# Patient Record
Sex: MALE | Race: OTHER | Employment: FULL TIME | URBAN - METROPOLITAN AREA
[De-identification: names, ages, dates, MRNs, and addresses within clinical notes are randomized per-mention and may not be internally consistent; named-entity substitution may affect disease eponyms.]

---

## 2017-03-13 ENCOUNTER — OFFICE VISIT (OUTPATIENT)
Dept: FAMILY MEDICINE CLINIC | Facility: CLINIC | Age: 52
End: 2017-03-13

## 2017-03-13 VITALS
SYSTOLIC BLOOD PRESSURE: 105 MMHG | RESPIRATION RATE: 18 BRPM | HEIGHT: 70 IN | BODY MASS INDEX: 30.35 KG/M2 | TEMPERATURE: 98 F | WEIGHT: 212 LBS | HEART RATE: 76 BPM | DIASTOLIC BLOOD PRESSURE: 76 MMHG

## 2017-03-13 DIAGNOSIS — M10.9 ACUTE GOUT OF RIGHT FOOT, UNSPECIFIED CAUSE: ICD-10-CM

## 2017-03-13 PROCEDURE — 99213 OFFICE O/P EST LOW 20 MIN: CPT | Performed by: FAMILY MEDICINE

## 2017-03-13 PROCEDURE — 99212 OFFICE O/P EST SF 10 MIN: CPT | Performed by: FAMILY MEDICINE

## 2017-03-13 RX ORDER — ALLOPURINOL 100 MG/1
100 TABLET ORAL DAILY
Qty: 90 TABLET | Refills: 1 | Status: SHIPPED | OUTPATIENT
Start: 2017-03-13 | End: 2017-06-13

## 2017-03-13 RX ORDER — INDOMETHACIN 50 MG/1
CAPSULE ORAL
Refills: 2 | COMMUNITY
Start: 2017-03-10 | End: 2017-05-26

## 2017-03-13 NOTE — PROGRESS NOTES
HPI:    Patient ID: Karly Sen is a 54year old male. HPI Comments: Pt presents with a gout flare up about one week ago. Pt did have some ETOH and red meat a week ago. Pt has been taking indomethacin and better today.  Pt does not take any preventive

## 2017-05-09 ENCOUNTER — OFFICE VISIT (OUTPATIENT)
Dept: FAMILY MEDICINE CLINIC | Facility: CLINIC | Age: 52
End: 2017-05-09

## 2017-05-09 ENCOUNTER — HOSPITAL ENCOUNTER (OUTPATIENT)
Dept: GENERAL RADIOLOGY | Age: 52
Discharge: HOME | End: 2017-05-09
Attending: FAMILY MEDICINE | Admitting: FAMILY MEDICINE
Payer: COMMERCIAL

## 2017-05-09 VITALS
BODY MASS INDEX: 30.49 KG/M2 | HEART RATE: 76 BPM | TEMPERATURE: 98 F | WEIGHT: 213 LBS | SYSTOLIC BLOOD PRESSURE: 129 MMHG | HEIGHT: 70 IN | RESPIRATION RATE: 16 BRPM | DIASTOLIC BLOOD PRESSURE: 83 MMHG

## 2017-05-09 DIAGNOSIS — S99.911A RIGHT ANKLE INJURY, INITIAL ENCOUNTER: ICD-10-CM

## 2017-05-09 PROCEDURE — 99213 OFFICE O/P EST LOW 20 MIN: CPT | Performed by: FAMILY MEDICINE

## 2017-05-09 PROCEDURE — 99212 OFFICE O/P EST SF 10 MIN: CPT | Performed by: FAMILY MEDICINE

## 2017-05-09 PROCEDURE — 73610 X-RAY EXAM OF ANKLE: CPT | Performed by: FAMILY MEDICINE

## 2017-05-09 RX ORDER — HYDROCODONE BITARTRATE AND ACETAMINOPHEN 5; 325 MG/1; MG/1
1 TABLET ORAL EVERY 6 HOURS PRN
Qty: 30 TABLET | Refills: 0 | Status: SHIPPED | OUTPATIENT
Start: 2017-05-09 | End: 2017-05-16 | Stop reason: ALTCHOICE

## 2017-05-09 NOTE — PROGRESS NOTES
HPI:    Patient ID: Avila Paulino is a 54year old male. HPI Comments: Pt presents with pain of the right ankle for about one month after he injured his ankle while jogging. Pt twisted it when he stepped on an uneven surface.  Pt has been limping and not indomethacin 50 MG Oral Cap TK ONE C PO BID WF Disp:  Rfl: 2   allopurinol 100 MG Oral Tab Take 1 tablet (100 mg total) by mouth daily. Disp: 90 tablet Rfl: 1   Vitamin D3 2000 UNITS Oral Cap Take 2,000 Units by mouth daily.  Disp:  Rfl:      Allergies:No Imaging & Referrals:  XR ANKLE (MIN 3 VIEWS), RIGHT (CPT=73610)       ZY#[id number]

## 2017-05-10 ENCOUNTER — TELEPHONE (OUTPATIENT)
Dept: FAMILY MEDICINE CLINIC | Facility: CLINIC | Age: 52
End: 2017-05-10

## 2017-05-10 DIAGNOSIS — R93.6 ABNORMAL X-RAY OF LOWER EXTREMITY: Primary | ICD-10-CM

## 2017-05-10 DIAGNOSIS — S99.911A ANKLE INJURY, RIGHT, INITIAL ENCOUNTER: ICD-10-CM

## 2017-05-11 ENCOUNTER — HOSPITAL ENCOUNTER (OUTPATIENT)
Dept: MRI IMAGING | Age: 52
Discharge: HOME | End: 2017-05-11
Attending: FAMILY MEDICINE
Payer: COMMERCIAL

## 2017-05-11 ENCOUNTER — TELEPHONE (OUTPATIENT)
Dept: FAMILY MEDICINE CLINIC | Facility: CLINIC | Age: 52
End: 2017-05-11

## 2017-05-11 DIAGNOSIS — S99.911A ANKLE INJURY, RIGHT, INITIAL ENCOUNTER: ICD-10-CM

## 2017-05-11 DIAGNOSIS — R93.6 ABNORMAL X-RAY OF LOWER EXTREMITY: ICD-10-CM

## 2017-05-11 PROCEDURE — 73721 MRI JNT OF LWR EXTRE W/O DYE: CPT | Performed by: FAMILY MEDICINE

## 2017-05-11 NOTE — TELEPHONE ENCOUNTER
Pt returned Dr. Chitra Huber call. Informed him of test results and recommendations. Name and  verified. Dr. Jayda Alba, Pt asking if he needs to wear a boot in the meantime?  Please advise    Result Notes      Notes Recorded by Giacomo Arauz MD on 20

## 2017-05-11 NOTE — TELEPHONE ENCOUNTER
It would be good if he used the ankle wrap for now. If he has a special boot that would be fine.  This will probably be discussed when he sees orthopedics

## 2017-05-12 ENCOUNTER — PATIENT MESSAGE (OUTPATIENT)
Dept: FAMILY MEDICINE CLINIC | Facility: CLINIC | Age: 52
End: 2017-05-12

## 2017-05-15 ENCOUNTER — TELEPHONE (OUTPATIENT)
Dept: FAMILY MEDICINE CLINIC | Facility: CLINIC | Age: 52
End: 2017-05-15

## 2017-05-15 RX ORDER — HYDROCODONE BITARTRATE AND ACETAMINOPHEN 10; 325 MG/1; MG/1
1 TABLET ORAL EVERY 6 HOURS PRN
Qty: 30 TABLET | Refills: 0 | Status: SHIPPED | OUTPATIENT
Start: 2017-05-15 | End: 2017-06-12

## 2017-05-15 NOTE — TELEPHONE ENCOUNTER
Pt called message left that his Rx is ready for  at the [facility] .   No further action needed

## 2017-05-15 NOTE — TELEPHONE ENCOUNTER
From: Zak Perrin  To: Ivonne Villasenor MD  Sent: 5/12/2017 7:35 AM CDT  Subject: Prescription Question     Good morning Dr. Ghulam Miranda sending you this note to see if you can prescribe a different type of medicine for the pain in my ankle.  The last one that you

## 2017-05-15 NOTE — TELEPHONE ENCOUNTER
From   Ketty Suh    To   Bhupinder Mcdaniel MD    Sent   5/12/2017  7:35 AM          Good morning Dr. Gwendolyn Humphries sending you this note to see if you can prescribe a different type of medicine for the pain in my ankle.  The last one that you prescribed is really no

## 2017-05-15 NOTE — TELEPHONE ENCOUNTER
Message noted: Chart reviewed and may increase norco to 10/325mg as requested times one. Script printed and left at  here at [facility]. Please notify pt.

## 2017-05-16 ENCOUNTER — OFFICE VISIT (OUTPATIENT)
Dept: ORTHOPEDICS CLINIC | Facility: CLINIC | Age: 52
End: 2017-05-16

## 2017-05-16 DIAGNOSIS — S93.421A SPRAIN OF DELTOID LIGAMENT OF RIGHT ANKLE, INITIAL ENCOUNTER: ICD-10-CM

## 2017-05-16 DIAGNOSIS — S93.491A SPRAIN OF ANTERIOR TALOFIBULAR LIGAMENT OF RIGHT ANKLE, INITIAL ENCOUNTER: Primary | ICD-10-CM

## 2017-05-16 PROCEDURE — 99212 OFFICE O/P EST SF 10 MIN: CPT | Performed by: ORTHOPAEDIC SURGERY

## 2017-05-16 PROCEDURE — L4360 PNEUMAT WALKING BOOT PRE CST: HCPCS | Performed by: ORTHOPAEDIC SURGERY

## 2017-05-16 PROCEDURE — 99243 OFF/OP CNSLTJ NEW/EST LOW 30: CPT | Performed by: ORTHOPAEDIC SURGERY

## 2017-05-16 NOTE — PROGRESS NOTES
5/16/2017  Otilio Cage  6/11/1965  54year old   male  Cara Farris MD    HPI:   Patient presents with:   Injury: R ankle - onset over 1 month ago when he twisted his ankle and fell while jogging - he was seen by PCP - has x-rays and MRI in the sys Relation Age of Onset   • Diabetes Other      Family h/o   • Heart Disease Father      Coronary Artery Disease   • Other[other] [OTHER] Father    • Diabetes Father    • Other[other] [OTHER] Brother      Gout   • Diabetes Mother         Smoking Status: Shin Correia procedures of both operative and non-operative treatment were discussed with the patient. This is a pleasant 46-year-old male with a right medial and lateral ankle sprain. The patient also has osteoarthritis of the tibiotalar joint.   The patient was to

## 2017-05-25 ENCOUNTER — TELEPHONE (OUTPATIENT)
Dept: ORTHOPEDICS CLINIC | Facility: CLINIC | Age: 52
End: 2017-05-25

## 2017-05-25 NOTE — TELEPHONE ENCOUNTER
Went to therapy. Knee swollen ever since wearing the boot. Pain level 7-8. Asking to be seen soon. Icing twice a day. Pain med at home Ibuprofen two pills at a time - over the counter. Taking twice a day.    Wanting something stronger for pain Wanting to be

## 2017-05-25 NOTE — TELEPHONE ENCOUNTER
Pt experiencing swollen right knee, after having physical therapy. LOV: 5/16. Pt states that over-the-counter meds are not working.  Please advise, thank you. (pharmacy confirmed)

## 2017-05-26 ENCOUNTER — HOSPITAL ENCOUNTER (OUTPATIENT)
Dept: GENERAL RADIOLOGY | Facility: HOSPITAL | Age: 52
Discharge: HOME | End: 2017-05-26
Attending: ORTHOPAEDIC SURGERY | Admitting: ORTHOPAEDIC SURGERY
Payer: COMMERCIAL

## 2017-05-26 ENCOUNTER — OFFICE VISIT (OUTPATIENT)
Dept: ORTHOPEDICS CLINIC | Facility: CLINIC | Age: 52
End: 2017-05-26

## 2017-05-26 VITALS — SYSTOLIC BLOOD PRESSURE: 118 MMHG | DIASTOLIC BLOOD PRESSURE: 68 MMHG | RESPIRATION RATE: 16 BRPM | HEART RATE: 72 BPM

## 2017-05-26 DIAGNOSIS — M25.561 RIGHT KNEE PAIN, UNSPECIFIED CHRONICITY: ICD-10-CM

## 2017-05-26 DIAGNOSIS — M25.461 KNEE EFFUSION, RIGHT: Primary | ICD-10-CM

## 2017-05-26 PROCEDURE — 73565 X-RAY EXAM OF KNEES: CPT | Performed by: ORTHOPAEDIC SURGERY

## 2017-05-26 PROCEDURE — 87205 SMEAR GRAM STAIN: CPT | Performed by: ORTHOPAEDIC SURGERY

## 2017-05-26 PROCEDURE — 87070 CULTURE OTHR SPECIMN AEROBIC: CPT | Performed by: ORTHOPAEDIC SURGERY

## 2017-05-26 PROCEDURE — 73560 X-RAY EXAM OF KNEE 1 OR 2: CPT | Performed by: ORTHOPAEDIC SURGERY

## 2017-05-26 PROCEDURE — 20610 DRAIN/INJ JOINT/BURSA W/O US: CPT | Performed by: ORTHOPAEDIC SURGERY

## 2017-05-26 PROCEDURE — 99214 OFFICE O/P EST MOD 30 MIN: CPT | Performed by: ORTHOPAEDIC SURGERY

## 2017-05-26 NOTE — PROCEDURES
After the patient's informed consent was obtained, an informational brochure was given to the patient. The patient's right knee was sterilely prepped and 60 cc of serosanguineous fluid was aspirated from the knee.   The patient tolerated the procedure well

## 2017-05-26 NOTE — PROGRESS NOTES
5/26/2017  Jordan Hudson  6/11/1965  54year old   male  Helena Brewer MD    HPI:   Patient presents with: Ankle Pain: f/u right ankle sprain. finished PT yesterday, states it still feels a little tender, but is much improved.   using crutches, part about the right knee. IMAGING AND TESTING:  Plain films of the right knee were obtained and reveals  evidence of previous ACL tear.     ASSESSMENT AND PLAN:   Knee effusion, right  (primary encounter diagnosis)  Right knee pain, unspecified chronicity

## 2017-05-26 NOTE — PROGRESS NOTES
Quick Note:    Please have patient follow up with his primary care physician the aspiration of the right knee revealed gout. The patient would benefit from medical treatment of the right knee effusion.   ______

## 2017-05-29 PROBLEM — S93.491D SPRAIN OF ANTERIOR TALOFIBULAR LIGAMENT OF RIGHT ANKLE, SUBSEQUENT ENCOUNTER: Status: ACTIVE | Noted: 2017-05-29

## 2017-05-31 ENCOUNTER — TELEPHONE (OUTPATIENT)
Dept: ORTHOPEDICS CLINIC | Facility: CLINIC | Age: 52
End: 2017-05-31

## 2017-05-31 NOTE — TELEPHONE ENCOUNTER
Spoke to pt and informed him of [facility] message below. Instructed pt to call Dr. Rohith Lim office and that I will send a message to Dr. Rohith Lim clinical staff as well. Pt verbalized understanding.

## 2017-05-31 NOTE — TELEPHONE ENCOUNTER
----- Message from Maria Del Carmen Garcia MD sent at 5/26/2017  5:25 PM CDT -----  Please have patient follow up with his primary care physician the aspiration of the right knee revealed gout.   The patient would benefit from medical treatment of the right kne

## 2017-06-09 ENCOUNTER — PATIENT MESSAGE (OUTPATIENT)
Dept: FAMILY MEDICINE CLINIC | Facility: CLINIC | Age: 52
End: 2017-06-09

## 2017-06-12 RX ORDER — ALLOPURINOL 100 MG/1
100 TABLET ORAL DAILY
Qty: 90 TABLET | Refills: 1 | Status: CANCELLED | OUTPATIENT
Start: 2017-06-12

## 2017-06-12 NOTE — TELEPHONE ENCOUNTER
Please advise on refill request.     [facility]      [address] [facility] [address] [facility], [phone], [phone]       Medication Detail         Disp Refills Start End        allopurinol 100 MG Oral Tab

## 2017-06-12 NOTE — TELEPHONE ENCOUNTER
From: Caitlyn Lira  To: Antonio Valdes MD  Sent: 6/9/2017 8:31 AM CDT  Subject: Non-Urgent Medical Question    Good Morning,    I am sending you this message to ask if you can give me a new prescription for (Norco) and alleprurinal. Please contact at [phone]

## 2017-06-12 NOTE — TELEPHONE ENCOUNTER
From: Nicholas Martinez  To: Miguel Du MD  Sent: 6/9/2017 8:28 AM CDT  Subject: Medication Renewal Request    Original authorizing provider: MD Nicholas Joe would like a refill of the following medications:  allopurinol 100 MG Oral Tab

## 2017-06-13 ENCOUNTER — OFFICE VISIT (OUTPATIENT)
Dept: ORTHOPEDICS CLINIC | Facility: CLINIC | Age: 52
End: 2017-06-13

## 2017-06-13 DIAGNOSIS — S93.491A SPRAIN OF ANTERIOR TALOFIBULAR LIGAMENT OF RIGHT ANKLE, INITIAL ENCOUNTER: Primary | ICD-10-CM

## 2017-06-13 PROCEDURE — 99213 OFFICE O/P EST LOW 20 MIN: CPT | Performed by: ORTHOPAEDIC SURGERY

## 2017-06-13 PROCEDURE — 99212 OFFICE O/P EST SF 10 MIN: CPT | Performed by: ORTHOPAEDIC SURGERY

## 2017-06-13 RX ORDER — NAPROXEN SODIUM 220 MG
TABLET ORAL
COMMUNITY
End: 2020-06-03 | Stop reason: ALTCHOICE

## 2017-06-13 RX ORDER — HYDROCODONE BITARTRATE AND ACETAMINOPHEN 10; 325 MG/1; MG/1
1 TABLET ORAL EVERY 6 HOURS PRN
Qty: 30 TABLET | Refills: 0 | Status: SHIPPED | OUTPATIENT
Start: 2017-06-13 | End: 2019-06-28

## 2017-06-13 NOTE — TELEPHONE ENCOUNTER
Message noted: Chart reviewed and may refill medication as requested times one. Script printed and left at  here at [facility]. Please notify patient.

## 2017-06-13 NOTE — PROGRESS NOTES
6/13/2017  Rebeka Enrique  6/11/1965  50year old   male  Tracey Valerio MD    HPI:   Patient presents with: Ankle Pain: right- pt states PT is going very well. pt states he dc'd crutches 4 days ago.      This is a pleasant 51-year-old male with a prev

## 2017-06-14 RX ORDER — ALLOPURINOL 100 MG/1
TABLET ORAL
Qty: 90 TABLET | Refills: 1 | Status: SHIPPED | OUTPATIENT
Start: 2017-06-14 | End: 2018-04-01

## 2018-04-02 RX ORDER — ALLOPURINOL 100 MG/1
TABLET ORAL
Qty: 90 TABLET | Refills: 0 | Status: SHIPPED | OUTPATIENT
Start: 2018-04-02 | End: 2019-04-26

## 2018-04-03 RX ORDER — ALLOPURINOL 100 MG/1
TABLET ORAL
Qty: 90 TABLET | Refills: 0 | OUTPATIENT
Start: 2018-04-03

## 2019-04-27 RX ORDER — ALLOPURINOL 100 MG/1
TABLET ORAL
Qty: 90 TABLET | Refills: 0 | Status: SHIPPED | OUTPATIENT
Start: 2019-04-27 | End: 2019-06-28

## 2019-04-27 NOTE — TELEPHONE ENCOUNTER
Review pended refill request as it does not fall under a protocol.     Last Rx: 4-2-18  LOV: 5-9-17    Requested Prescriptions     Pending Prescriptions Disp Refills   • ALLOPURINOL 100 MG Oral Tab [Pharmacy Med Name: ALLOPURINOL 100MG TABLETS] 90 tablet 0

## 2019-06-01 ENCOUNTER — TELEPHONE (OUTPATIENT)
Dept: FAMILY MEDICINE CLINIC | Facility: CLINIC | Age: 54
End: 2019-06-01

## 2019-06-01 NOTE — TELEPHONE ENCOUNTER
•  ALLOPURINOL 100 MG Oral Tab, TAKE 1 TABLET BY MOUTH DAILY, Disp: 90 tablet, Rfl: 0      Alternative requested: Send new refill, insurance prefers 90 Day supply

## 2019-06-01 NOTE — TELEPHONE ENCOUNTER
Patient received a refill for Allopurinol 4/27/19 for 90 day supply, requires a follow up for further refills, no OV since 5/9/17. Patient has refill available for 30 days with 1 refill, script transferred to [facility] from [place].  [facility] advised no fur

## 2019-06-28 ENCOUNTER — LAB ENCOUNTER (OUTPATIENT)
Dept: LAB | Age: 54
End: 2019-06-28
Attending: PHYSICIAN ASSISTANT
Payer: COMMERCIAL

## 2019-06-28 ENCOUNTER — OFFICE VISIT (OUTPATIENT)
Dept: FAMILY MEDICINE CLINIC | Facility: CLINIC | Age: 54
End: 2019-06-28
Payer: COMMERCIAL

## 2019-06-28 VITALS
HEIGHT: 70 IN | HEART RATE: 83 BPM | SYSTOLIC BLOOD PRESSURE: 115 MMHG | WEIGHT: 204 LBS | TEMPERATURE: 98 F | DIASTOLIC BLOOD PRESSURE: 78 MMHG | BODY MASS INDEX: 29.2 KG/M2

## 2019-06-28 DIAGNOSIS — M10.9 ACUTE GOUT OF RIGHT KNEE, UNSPECIFIED CAUSE: ICD-10-CM

## 2019-06-28 DIAGNOSIS — M10.9 ACUTE GOUT OF RIGHT KNEE, UNSPECIFIED CAUSE: Primary | ICD-10-CM

## 2019-06-28 DIAGNOSIS — J01.00 ACUTE NON-RECURRENT MAXILLARY SINUSITIS: ICD-10-CM

## 2019-06-28 DIAGNOSIS — Z12.11 ENCOUNTER FOR SCREENING COLONOSCOPY: ICD-10-CM

## 2019-06-28 PROCEDURE — 85025 COMPLETE CBC W/AUTO DIFF WBC: CPT

## 2019-06-28 PROCEDURE — 99212 OFFICE O/P EST SF 10 MIN: CPT | Performed by: PHYSICIAN ASSISTANT

## 2019-06-28 PROCEDURE — 84550 ASSAY OF BLOOD/URIC ACID: CPT

## 2019-06-28 PROCEDURE — 80053 COMPREHEN METABOLIC PANEL: CPT

## 2019-06-28 PROCEDURE — 99213 OFFICE O/P EST LOW 20 MIN: CPT | Performed by: PHYSICIAN ASSISTANT

## 2019-06-28 PROCEDURE — 36415 COLL VENOUS BLD VENIPUNCTURE: CPT

## 2019-06-28 RX ORDER — PREDNISONE 20 MG/1
TABLET ORAL
Qty: 10 TABLET | Refills: 0 | Status: SHIPPED | OUTPATIENT
Start: 2019-06-28 | End: 2020-02-03

## 2019-06-28 RX ORDER — OMEGA-3S/DHA/EPA/FISH OIL/D3 1150-1000
LIQUID (ML) ORAL DAILY
COMMUNITY

## 2019-06-28 RX ORDER — AZITHROMYCIN 250 MG/1
TABLET, FILM COATED ORAL
Qty: 6 TABLET | Refills: 0 | Status: SHIPPED | OUTPATIENT
Start: 2019-06-28 | End: 2020-06-03 | Stop reason: ALTCHOICE

## 2019-06-28 RX ORDER — INDOMETHACIN 50 MG/1
50 CAPSULE ORAL
Qty: 30 CAPSULE | Refills: 0 | Status: SHIPPED | OUTPATIENT
Start: 2019-06-28 | End: 2020-02-03

## 2019-06-28 RX ORDER — ALLOPURINOL 100 MG/1
100 TABLET ORAL
Qty: 90 TABLET | Refills: 1 | Status: SHIPPED | OUTPATIENT
Start: 2019-06-28 | End: 2019-12-11

## 2019-06-28 NOTE — PROGRESS NOTES
HPI:    Patient ID: Edgardo Guerrero is a 48year old male. Patient presents for gout flare on the right knee for the past one day and the left big toe last Monday. Patient is compliant with allopurinol.  Patient does not drink ETOH and stays away from food route on day one then 1 tablet daily for next 4 days. Disp: 6 tablet Rfl: 0   Naproxen Sodium (ALEVE) 220 MG Oral Tab Take by mouth. Disp:  Rfl:    Vitamin D3 2000 UNITS Oral Cap Take 2,000 Units by mouth daily.  Disp:  Rfl:      Allergies:No Known Allergie prescription for prednisone. –Give educated patient on how to use medication. –To continue with ice on the right knee. –To avoid drinking alcohol and red meats that aggravate his gout flares. –To continue with allopurinol once daily.   –Educated patient total) by mouth once daily. • azithromycin (ZITHROMAX Z-JEY) 250 MG Oral Tab 6 tablet 0     Sig: To take 2 tablets by oral route on day one then 1 tablet daily for next 4 days.        Imaging & Referrals:  OP REFERRAL TO [facility] TELEPHONE COLON SCREEN

## 2019-06-28 NOTE — PATIENT INSTRUCTIONS
Indomethacin  Take 1 tablet twice per day as needed for the pain in the right knee    Prednisone   Take 2 tablets for 3 days then 1 tablet for 4 days.      Allopurinol  I refilled the medication for you  Get the blood work done before you leave  Do not doub

## 2019-11-18 ENCOUNTER — TELEPHONE (OUTPATIENT)
Dept: FAMILY MEDICINE CLINIC | Facility: CLINIC | Age: 54
End: 2019-11-18

## 2019-11-18 NOTE — TELEPHONE ENCOUNTER
No Protocol protocol:    Patient states numbness/tingling in bilateral hands for approximately two months. 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  Denies chest pain, shortness of jackie

## 2019-11-18 NOTE — TELEPHONE ENCOUNTER
Pt scheduled appt through [employer] with following sx:    Visit Type: [facility] ([id number])      11/20/2019  10:45 AM  15 mins. DILLON Lazo         [facility]      Patient Comments:   My hands are getting numb. Both left and right hand.  Mainly the

## 2019-12-11 RX ORDER — ALLOPURINOL 100 MG/1
TABLET ORAL
Qty: 30 TABLET | Refills: 0 | Status: SHIPPED | OUTPATIENT
Start: 2019-12-11 | End: 2020-02-14

## 2019-12-11 NOTE — TELEPHONE ENCOUNTER
Refill noted. Chart reviewed. Okay to fill time one for 30 days with no additional refills. Patient needs to follow-up with Dr. Serina Dempsey for further refills.

## 2020-02-03 ENCOUNTER — OFFICE VISIT (OUTPATIENT)
Dept: FAMILY MEDICINE CLINIC | Facility: CLINIC | Age: 55
End: 2020-02-03
Payer: COMMERCIAL

## 2020-02-03 VITALS
OXYGEN SATURATION: 97 % | HEART RATE: 66 BPM | TEMPERATURE: 98 F | WEIGHT: 216 LBS | SYSTOLIC BLOOD PRESSURE: 133 MMHG | HEIGHT: 70 IN | DIASTOLIC BLOOD PRESSURE: 96 MMHG | RESPIRATION RATE: 18 BRPM | BODY MASS INDEX: 30.92 KG/M2

## 2020-02-03 DIAGNOSIS — R07.9 CHEST PAIN, UNSPECIFIED TYPE: ICD-10-CM

## 2020-02-03 DIAGNOSIS — M10.9 ACUTE GOUT OF LEFT FOOT, UNSPECIFIED CAUSE: Primary | ICD-10-CM

## 2020-02-03 PROCEDURE — 99213 OFFICE O/P EST LOW 20 MIN: CPT | Performed by: PHYSICIAN ASSISTANT

## 2020-02-03 RX ORDER — PREDNISONE 20 MG/1
TABLET ORAL
Qty: 10 TABLET | Refills: 0 | Status: SHIPPED | OUTPATIENT
Start: 2020-02-03 | End: 2020-06-03 | Stop reason: ALTCHOICE

## 2020-02-03 RX ORDER — INDOMETHACIN 50 MG/1
50 CAPSULE ORAL
Qty: 30 CAPSULE | Refills: 0 | Status: SHIPPED | OUTPATIENT
Start: 2020-02-03 | End: 2020-06-03 | Stop reason: ALTCHOICE

## 2020-02-03 NOTE — PROGRESS NOTES
HPI:    Patient ID: Steven White is a 48year old male. Patient presents for gout in his left foot for the past 7 days. Patient is taking allopurinol daily and does not miss doses. Watches what he is eating.  Has avoided alcohol and meats as much as pos 250 MG Oral Tab To take 2 tablets by oral route on day one then 1 tablet daily for next 4 days. (Patient not taking: Reported on 2/3/2020 ) 6 tablet 0   • Naproxen Sodium (ALEVE) 220 MG Oral Tab Take by mouth.        Allergies:  Fish-Derived Produc*    HIVE with patient, advised the following:  -Gave pt prednisone and indomethacin.   -Educated pt on how to take the medications   -To continue with allopurinol   -To continue with diet management as well   -To call or follow-up with worsening symptoms or concern

## 2020-02-14 NOTE — TELEPHONE ENCOUNTER
Review pended refill request as it does not fall under a protocol.   Requested Prescriptions     Pending Prescriptions Disp Refills   • ALLOPURINOL 100 MG Oral Tab [Pharmacy Med Name: ALLOPURINOL 100 MG TABLET] 30 tablet 0     Sig: TAKE 1 TABLET BY MOUTH EV

## 2020-02-15 RX ORDER — ALLOPURINOL 100 MG/1
TABLET ORAL
Qty: 90 TABLET | Refills: 0 | Status: SHIPPED | OUTPATIENT
Start: 2020-02-15 | End: 2020-09-11

## 2020-06-03 ENCOUNTER — OFFICE VISIT (OUTPATIENT)
Dept: FAMILY MEDICINE CLINIC | Facility: CLINIC | Age: 55
End: 2020-06-03

## 2020-06-03 VITALS
BODY MASS INDEX: 31.21 KG/M2 | HEIGHT: 70 IN | SYSTOLIC BLOOD PRESSURE: 130 MMHG | DIASTOLIC BLOOD PRESSURE: 81 MMHG | RESPIRATION RATE: 16 BRPM | HEART RATE: 72 BPM | WEIGHT: 218 LBS

## 2020-06-03 DIAGNOSIS — Z02.4 ENCOUNTER FOR DEPARTMENT OF TRANSPORTATION (DOT) EXAMINATION FOR TRUCKING LICENSE: Primary | ICD-10-CM

## 2020-06-03 PROCEDURE — 81003 URINALYSIS AUTO W/O SCOPE: CPT | Performed by: PHYSICIAN ASSISTANT

## 2020-06-03 NOTE — PROGRESS NOTES
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 Patient is doing fine at this time. 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   Patient denies Transportation needs:        Medical: Not on file        Non-medical: Not on file    Tobacco Use      Smoking status: Never Smoker      Smokeless tobacco: Never Used    Substance and Sexual Activity      Alcohol use:  Yes        Alcohol/week: 0.0 standard d cough, chest tightness, shortness of breath and wheezing. Cardiovascular: Negative for chest pain and palpitations. Gastrointestinal: Negative for nausea, vomiting, abdominal pain, diarrhea, constipation, blood in stool and abdominal distention.    Gen Right: No inguinal adenopathy present. Left: No inguinal adenopathy present. Neurological: He is alert and oriented to person, place, and time. He has normal reflexes. Skin: No rash noted. Psychiatric: He has a normal mood and affect.    Oliver

## 2020-09-11 RX ORDER — ALLOPURINOL 100 MG/1
TABLET ORAL
Qty: 90 TABLET | Refills: 0 | Status: SHIPPED | OUTPATIENT
Start: 2020-09-11 | End: 2020-12-16

## 2020-12-16 RX ORDER — ALLOPURINOL 100 MG/1
TABLET ORAL
Qty: 90 TABLET | Refills: 0 | Status: SHIPPED | OUTPATIENT
Start: 2020-12-16 | End: 2021-05-19

## 2021-04-17 ENCOUNTER — MED REC SCAN ONLY (OUTPATIENT)
Dept: FAMILY MEDICINE CLINIC | Facility: CLINIC | Age: 56
End: 2021-04-17

## 2021-05-19 RX ORDER — ALLOPURINOL 100 MG/1
100 TABLET ORAL DAILY
Qty: 90 TABLET | Refills: 0 | Status: SHIPPED | OUTPATIENT
Start: 2021-05-19 | End: 2021-08-24

## 2021-07-26 ENCOUNTER — OFFICE VISIT (OUTPATIENT)
Dept: FAMILY MEDICINE CLINIC | Facility: CLINIC | Age: 56
End: 2021-07-26
Payer: COMMERCIAL

## 2021-07-26 VITALS
RESPIRATION RATE: 18 BRPM | TEMPERATURE: 97 F | WEIGHT: 203 LBS | DIASTOLIC BLOOD PRESSURE: 84 MMHG | HEART RATE: 70 BPM | BODY MASS INDEX: 29.06 KG/M2 | HEIGHT: 70 IN | SYSTOLIC BLOOD PRESSURE: 132 MMHG

## 2021-07-26 DIAGNOSIS — L30.9 ECZEMA OF BOTH HANDS: ICD-10-CM

## 2021-07-26 DIAGNOSIS — M10.9 ACUTE GOUT OF LEFT FOOT, UNSPECIFIED CAUSE: Primary | ICD-10-CM

## 2021-07-26 PROCEDURE — 99213 OFFICE O/P EST LOW 20 MIN: CPT | Performed by: PHYSICIAN ASSISTANT

## 2021-07-26 PROCEDURE — 3079F DIAST BP 80-89 MM HG: CPT | Performed by: PHYSICIAN ASSISTANT

## 2021-07-26 PROCEDURE — 3008F BODY MASS INDEX DOCD: CPT | Performed by: PHYSICIAN ASSISTANT

## 2021-07-26 PROCEDURE — 3075F SYST BP GE 130 - 139MM HG: CPT | Performed by: PHYSICIAN ASSISTANT

## 2021-07-26 RX ORDER — INDOMETHACIN 50 MG/1
50 CAPSULE ORAL
Qty: 30 CAPSULE | Refills: 0 | Status: SHIPPED | OUTPATIENT
Start: 2021-07-26

## 2021-07-26 RX ORDER — MOMETASONE FUROATE 1 MG/G
1 OINTMENT TOPICAL DAILY
Qty: 45 G | Refills: 0 | Status: SHIPPED | OUTPATIENT
Start: 2021-07-26

## 2021-07-26 NOTE — PROGRESS NOTES
HPI:    Patient ID: Fernanda De La Rosa is a 49year old male. Patient presents for flare up on left foot. Swelling was present, but has gone over the weekend. He has taken prednisone and indomethacin with some relief.  He has note eaten more chicken and drink regular rhythm. Pulses: Normal pulses. Heart sounds: Normal heart sounds. Pulmonary:      Effort: Pulmonary effort is normal. No respiratory distress. Breath sounds: Normal breath sounds. No wheezing or rales.    Musculoskeletal:         Ge 0     Sig: Apply 1 Application topically 3 (three) times daily. • mometasone 0.1 % External Ointment 45 g 0     Sig: Apply 1 Application topically daily.        Imaging & Referrals:  None         QV#[id number]

## 2021-08-24 RX ORDER — ALLOPURINOL 100 MG/1
TABLET ORAL
Qty: 90 TABLET | Refills: 0 | Status: SHIPPED | OUTPATIENT
Start: 2021-08-24

## 2022-04-05 RX ORDER — INDOMETHACIN 50 MG/1
50 CAPSULE ORAL
Qty: 30 CAPSULE | Refills: 0 | Status: SHIPPED | OUTPATIENT
Start: 2022-04-05

## 2022-04-05 NOTE — TELEPHONE ENCOUNTER
Message noted: Chart reviewed and may refill medication as requested. Prescription sent to listed pharmacy. Pharmacy to notify patient.  Pt notified through [facility]

## 2022-04-06 RX ORDER — INDOMETHACIN 50 MG/1
50 CAPSULE ORAL
Qty: 30 CAPSULE | Refills: 0 | Status: SHIPPED | OUTPATIENT
Start: 2022-04-06

## 2022-09-30 RX ORDER — INDOMETHACIN 50 MG/1
50 CAPSULE ORAL
Qty: 30 CAPSULE | Refills: 0 | Status: CANCELLED | OUTPATIENT
Start: 2022-09-30

## 2022-10-01 RX ORDER — ALLOPURINOL 100 MG/1
100 TABLET ORAL DAILY
Qty: 90 TABLET | Refills: 0 | Status: SHIPPED | OUTPATIENT
Start: 2022-10-01

## 2022-10-01 RX ORDER — INDOMETHACIN 50 MG/1
50 CAPSULE ORAL
Qty: 30 CAPSULE | Refills: 0 | Status: SHIPPED | OUTPATIENT
Start: 2022-10-01

## 2022-10-01 NOTE — TELEPHONE ENCOUNTER
Message noted: Chart reviewed and may refill medication as requested times one. Prescription sent to listed pharmacy. Pharmacy to notify patient to make appointment for further refills  Pt notified through [facility] also.

## 2022-10-02 NOTE — TELEPHONE ENCOUNTER
rx already sent to pharm by Dr. Sandeep Pavon on 10/1/22. Risk Ident message sent to please schedule appt as requested.

## 2022-12-29 RX ORDER — ALLOPURINOL 100 MG/1
TABLET ORAL
Qty: 90 TABLET | Refills: 0 | Status: SHIPPED | OUTPATIENT
Start: 2022-12-29

## 2022-12-29 NOTE — TELEPHONE ENCOUNTER
Message noted: Chart reviewed and may refill medication as requested times one. Prescription sent to listed pharmacy. Pharmacy to notify patient to make appointment for further refills  Pt notified through [facility] also.

## 2023-01-05 ENCOUNTER — OFFICE VISIT (OUTPATIENT)
Dept: FAMILY MEDICINE CLINIC | Facility: CLINIC | Age: 58
End: 2023-01-05
Payer: COMMERCIAL

## 2023-01-05 ENCOUNTER — LAB ENCOUNTER (OUTPATIENT)
Dept: LAB | Age: 58
End: 2023-01-05
Attending: PHYSICIAN ASSISTANT
Payer: COMMERCIAL

## 2023-01-05 VITALS
HEIGHT: 70 IN | BODY MASS INDEX: 31.07 KG/M2 | WEIGHT: 217 LBS | DIASTOLIC BLOOD PRESSURE: 83 MMHG | HEART RATE: 79 BPM | SYSTOLIC BLOOD PRESSURE: 124 MMHG

## 2023-01-05 DIAGNOSIS — M10.9 GOUT INVOLVING TOE, UNSPECIFIED CAUSE, UNSPECIFIED CHRONICITY, UNSPECIFIED LATERALITY: ICD-10-CM

## 2023-01-05 DIAGNOSIS — Z12.11 SCREENING FOR MALIGNANT NEOPLASM OF COLON: ICD-10-CM

## 2023-01-05 DIAGNOSIS — E66.9 MILDLY OBESE: ICD-10-CM

## 2023-01-05 DIAGNOSIS — Z12.5 SCREENING FOR MALIGNANT NEOPLASM OF PROSTATE: ICD-10-CM

## 2023-01-05 DIAGNOSIS — Z00.00 ROUTINE GENERAL MEDICAL EXAMINATION AT A HEALTH CARE FACILITY: Primary | ICD-10-CM

## 2023-01-05 DIAGNOSIS — E55.9 VITAMIN D DEFICIENCY: ICD-10-CM

## 2023-01-05 DIAGNOSIS — L30.9 DERMATITIS: ICD-10-CM

## 2023-01-05 DIAGNOSIS — R07.9 CHEST PAIN, UNSPECIFIED TYPE: ICD-10-CM

## 2023-01-05 DIAGNOSIS — Z00.00 ROUTINE GENERAL MEDICAL EXAMINATION AT A HEALTH CARE FACILITY: ICD-10-CM

## 2023-01-05 LAB
ALBUMIN SERPL-MCNC: 4.2 G/DL (ref 3.4–5)
ALBUMIN/GLOB SERPL: 1.2 {RATIO} (ref 1–2)
ALP LIVER SERPL-CCNC: 76 U/L
ALT SERPL-CCNC: 34 U/L
ANION GAP SERPL CALC-SCNC: 8 MMOL/L (ref 0–18)
AST SERPL-CCNC: 18 U/L (ref 15–37)
BASOPHILS # BLD AUTO: 0.06 X10(3) UL (ref 0–0.2)
BASOPHILS NFR BLD AUTO: 0.7 %
BILIRUB SERPL-MCNC: 0.4 MG/DL (ref 0.1–2)
BUN BLD-MCNC: 11 MG/DL (ref 7–18)
BUN/CREAT SERPL: 10.8 (ref 10–20)
CALCIUM BLD-MCNC: 9 MG/DL (ref 8.5–10.1)
CHLORIDE SERPL-SCNC: 105 MMOL/L (ref 98–112)
CHOLEST SERPL-MCNC: 236 MG/DL (ref ?–200)
CO2 SERPL-SCNC: 25 MMOL/L (ref 21–32)
COMPLEXED PSA SERPL-MCNC: 2.54 NG/ML (ref ?–4)
CREAT BLD-MCNC: 1.02 MG/DL
DEPRECATED RDW RBC AUTO: 42.8 FL (ref 35.1–46.3)
EOSINOPHIL # BLD AUTO: 0.15 X10(3) UL (ref 0–0.7)
EOSINOPHIL NFR BLD AUTO: 1.7 %
ERYTHROCYTE [DISTWIDTH] IN BLOOD BY AUTOMATED COUNT: 13.7 % (ref 11–15)
EST. AVERAGE GLUCOSE BLD GHB EST-MCNC: 117 MG/DL (ref 68–126)
FASTING PATIENT LIPID ANSWER: NO
FASTING STATUS PATIENT QL REPORTED: NO
GFR SERPLBLD BASED ON 1.73 SQ M-ARVRAT: 87 ML/MIN/1.73M2 (ref 60–?)
GLOBULIN PLAS-MCNC: 3.4 G/DL (ref 2.8–4.4)
GLUCOSE BLD-MCNC: 97 MG/DL (ref 70–99)
HBA1C MFR BLD: 5.7 % (ref ?–5.7)
HCT VFR BLD AUTO: 41.5 %
HDLC SERPL-MCNC: 42 MG/DL (ref 40–59)
HGB BLD-MCNC: 14.3 G/DL
IMM GRANULOCYTES # BLD AUTO: 0.02 X10(3) UL (ref 0–1)
IMM GRANULOCYTES NFR BLD: 0.2 %
LDLC SERPL CALC-MCNC: 141 MG/DL (ref ?–100)
LYMPHOCYTES # BLD AUTO: 2.07 X10(3) UL (ref 1–4)
LYMPHOCYTES NFR BLD AUTO: 22.8 %
MCH RBC QN AUTO: 29.1 PG (ref 26–34)
MCHC RBC AUTO-ENTMCNC: 34.5 G/DL (ref 31–37)
MCV RBC AUTO: 84.5 FL
MONOCYTES # BLD AUTO: 0.66 X10(3) UL (ref 0.1–1)
MONOCYTES NFR BLD AUTO: 7.3 %
NEUTROPHILS # BLD AUTO: 6.11 X10 (3) UL (ref 1.5–7.7)
NEUTROPHILS # BLD AUTO: 6.11 X10(3) UL (ref 1.5–7.7)
NEUTROPHILS NFR BLD AUTO: 67.3 %
NONHDLC SERPL-MCNC: 194 MG/DL (ref ?–130)
OSMOLALITY SERPL CALC.SUM OF ELEC: 285 MOSM/KG (ref 275–295)
PLATELET # BLD AUTO: 214 10(3)UL (ref 150–450)
POTASSIUM SERPL-SCNC: 4.1 MMOL/L (ref 3.5–5.1)
PROT SERPL-MCNC: 7.6 G/DL (ref 6.4–8.2)
RBC # BLD AUTO: 4.91 X10(6)UL
SODIUM SERPL-SCNC: 138 MMOL/L (ref 136–145)
TRIGL SERPL-MCNC: 293 MG/DL (ref 30–149)
TSI SER-ACNC: 1.81 MIU/ML (ref 0.36–3.74)
URATE SERPL-MCNC: 10.8 MG/DL
VIT D+METAB SERPL-MCNC: 13.4 NG/ML (ref 30–100)
VLDLC SERPL CALC-MCNC: 55 MG/DL (ref 0–30)
WBC # BLD AUTO: 9.1 X10(3) UL (ref 4–11)

## 2023-01-05 PROCEDURE — 3079F DIAST BP 80-89 MM HG: CPT | Performed by: PHYSICIAN ASSISTANT

## 2023-01-05 PROCEDURE — 99396 PREV VISIT EST AGE 40-64: CPT | Performed by: PHYSICIAN ASSISTANT

## 2023-01-05 PROCEDURE — 84550 ASSAY OF BLOOD/URIC ACID: CPT

## 2023-01-05 PROCEDURE — 80061 LIPID PANEL: CPT

## 2023-01-05 PROCEDURE — 3008F BODY MASS INDEX DOCD: CPT | Performed by: PHYSICIAN ASSISTANT

## 2023-01-05 PROCEDURE — 84403 ASSAY OF TOTAL TESTOSTERONE: CPT

## 2023-01-05 PROCEDURE — 85025 COMPLETE CBC W/AUTO DIFF WBC: CPT

## 2023-01-05 PROCEDURE — 99213 OFFICE O/P EST LOW 20 MIN: CPT | Performed by: PHYSICIAN ASSISTANT

## 2023-01-05 PROCEDURE — 83036 HEMOGLOBIN GLYCOSYLATED A1C: CPT

## 2023-01-05 PROCEDURE — 80053 COMPREHEN METABOLIC PANEL: CPT

## 2023-01-05 PROCEDURE — 84443 ASSAY THYROID STIM HORMONE: CPT

## 2023-01-05 PROCEDURE — 3074F SYST BP LT 130 MM HG: CPT | Performed by: PHYSICIAN ASSISTANT

## 2023-01-05 PROCEDURE — 36415 COLL VENOUS BLD VENIPUNCTURE: CPT

## 2023-01-05 PROCEDURE — 82306 VITAMIN D 25 HYDROXY: CPT

## 2023-01-05 PROCEDURE — 84402 ASSAY OF FREE TESTOSTERONE: CPT

## 2023-01-06 ENCOUNTER — EKG ENCOUNTER (OUTPATIENT)
Dept: LAB | Age: 58
End: 2023-01-06
Attending: PHYSICIAN ASSISTANT
Payer: COMMERCIAL

## 2023-01-06 ENCOUNTER — PATIENT MESSAGE (OUTPATIENT)
Dept: FAMILY MEDICINE CLINIC | Facility: CLINIC | Age: 58
End: 2023-01-06

## 2023-01-06 DIAGNOSIS — R07.9 CHEST PAIN, UNSPECIFIED TYPE: ICD-10-CM

## 2023-01-06 LAB
ATRIAL RATE: 65 BPM
P AXIS: 59 DEGREES
P-R INTERVAL: 158 MS
Q-T INTERVAL: 400 MS
QRS DURATION: 78 MS
QTC CALCULATION (BEZET): 416 MS
R AXIS: 22 DEGREES
T AXIS: 8 DEGREES
VENTRICULAR RATE: 65 BPM

## 2023-01-06 PROCEDURE — 93005 ELECTROCARDIOGRAM TRACING: CPT

## 2023-01-06 PROCEDURE — 93010 ELECTROCARDIOGRAM REPORT: CPT | Performed by: INTERNAL MEDICINE

## 2023-01-07 RX ORDER — INDOMETHACIN 50 MG/1
50 CAPSULE ORAL
Qty: 30 CAPSULE | Refills: 0 | Status: CANCELLED | OUTPATIENT
Start: 2023-01-07

## 2023-01-08 RX ORDER — ERGOCALCIFEROL 1.25 MG/1
50000 CAPSULE ORAL
Qty: 12 CAPSULE | Refills: 3 | Status: SHIPPED | OUTPATIENT
Start: 2023-01-08 | End: 2023-04-08

## 2023-01-08 RX ORDER — ALLOPURINOL 300 MG/1
300 TABLET ORAL DAILY
Qty: 90 TABLET | Refills: 3 | Status: SHIPPED | OUTPATIENT
Start: 2023-01-08 | End: 2023-04-08

## 2023-01-08 RX ORDER — INDOMETHACIN 50 MG/1
50 CAPSULE ORAL
Qty: 30 CAPSULE | Refills: 0 | Status: SHIPPED | OUTPATIENT
Start: 2023-01-08

## 2023-01-10 LAB
TESTOSTERONE, FREE, S: 6.45 NG/DL
TESTOSTERONE, TOTAL, S: 240 NG/DL

## 2023-01-19 ENCOUNTER — OFFICE VISIT (OUTPATIENT)
Dept: DERMATOLOGY CLINIC | Facility: CLINIC | Age: 58
End: 2023-01-19

## 2023-01-19 DIAGNOSIS — L30.8 PSORIASIFORM DERMATITIS: ICD-10-CM

## 2023-01-19 DIAGNOSIS — L30.9 HAND DERMATITIS: Primary | ICD-10-CM

## 2023-01-19 PROCEDURE — 99204 OFFICE O/P NEW MOD 45 MIN: CPT | Performed by: STUDENT IN AN ORGANIZED HEALTH CARE EDUCATION/TRAINING PROGRAM

## 2023-01-19 RX ORDER — CLOBETASOL PROPIONATE 0.5 MG/G
OINTMENT TOPICAL
Qty: 60 G | Refills: 1 | Status: SHIPPED | OUTPATIENT
Start: 2023-01-19

## 2023-05-10 RX ORDER — INDOMETHACIN 50 MG/1
50 CAPSULE ORAL
Qty: 30 CAPSULE | Refills: 2 | Status: SHIPPED | OUTPATIENT
Start: 2023-05-10

## 2023-05-10 NOTE — TELEPHONE ENCOUNTER
Please advise if ok to provide additional refills. Requested Prescriptions   Pending Prescriptions Disp Refills    indomethacin 50 MG Oral Cap 30 capsule 2     Sig: Take 1 capsule (50 mg total) by mouth 3 (three) times daily with meals. As needed for pain or inflammation from gout exacerbation.        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    Jordin Slates, [address], [place] Heck[place], [employer]    Office Visit    3 years ago Acute gout of left foot, unspecified cause    [facility], [place] Bowling [facility]    Office Visit                           Recent Outpatient Visits              3 months ago Hand dermatitis    Jordin Pavon, Orlando Bowling MD    Office Visit    4 months ago Routine general medical examination at a health care facility    Abelino Slates, [address], [place] Heck[place], [employer]    Office Visit    1 year ago Acute gout of left foot, unspecified cause    [facility], [place] Bowling PA-C    Office Visit    2 years ago 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, [place], [place]    Office Visit    3 years ago Acute gout of left foot, unspecified cause    [facility], [address] [facility] Earle Dashmhbeverly Delgado [place], [place]    Office Visit

## 2023-07-05 ENCOUNTER — HOSPITAL ENCOUNTER (OUTPATIENT)
Dept: ULTRASOUND IMAGING | Facility: HOSPITAL | Age: 58
Discharge: HOME | End: 2023-07-05
Attending: PHYSICIAN ASSISTANT
Payer: COMMERCIAL

## 2023-07-05 ENCOUNTER — PATIENT MESSAGE (OUTPATIENT)
Dept: FAMILY MEDICINE CLINIC | Facility: CLINIC | Age: 58
End: 2023-07-05

## 2023-07-05 ENCOUNTER — OFFICE VISIT (OUTPATIENT)
Dept: FAMILY MEDICINE CLINIC | Facility: CLINIC | Age: 58
End: 2023-07-05

## 2023-07-05 VITALS
BODY MASS INDEX: 31.21 KG/M2 | HEART RATE: 64 BPM | HEIGHT: 70 IN | SYSTOLIC BLOOD PRESSURE: 148 MMHG | WEIGHT: 218 LBS | DIASTOLIC BLOOD PRESSURE: 94 MMHG

## 2023-07-05 DIAGNOSIS — N50.812 PAIN IN LEFT TESTICLE: ICD-10-CM

## 2023-07-05 DIAGNOSIS — N50.812 PAIN IN LEFT TESTICLE: Primary | ICD-10-CM

## 2023-07-05 LAB
APPEARANCE: CLEAR
BILIRUBIN: NEGATIVE
GLUCOSE (URINE DIPSTICK): NEGATIVE MG/DL
KETONES (URINE DIPSTICK): NEGATIVE MG/DL
LEUKOCYTES: NEGATIVE
MULTISTIX LOT#: NORMAL NUMERIC
NITRITE, URINE: NEGATIVE
OCCULT BLOOD: NEGATIVE
PH, URINE: 5.5 (ref 4.5–8)
PROTEIN (URINE DIPSTICK): NEGATIVE MG/DL
SPECIFIC GRAVITY: 1.02 (ref 1–1.03)
URINE-COLOR: YELLOW
UROBILINOGEN,SEMI-QN: 0.2 MG/DL (ref 0–1.9)

## 2023-07-05 PROCEDURE — 99213 OFFICE O/P EST LOW 20 MIN: CPT | Performed by: PHYSICIAN ASSISTANT

## 2023-07-05 PROCEDURE — 3077F SYST BP >= 140 MM HG: CPT | Performed by: PHYSICIAN ASSISTANT

## 2023-07-05 PROCEDURE — 93975 VASCULAR STUDY: CPT | Performed by: PHYSICIAN ASSISTANT

## 2023-07-05 PROCEDURE — 3008F BODY MASS INDEX DOCD: CPT | Performed by: PHYSICIAN ASSISTANT

## 2023-07-05 PROCEDURE — 76870 US EXAM SCROTUM: CPT | Performed by: PHYSICIAN ASSISTANT

## 2023-07-05 PROCEDURE — 81002 URINALYSIS NONAUTO W/O SCOPE: CPT | Performed by: PHYSICIAN ASSISTANT

## 2023-07-05 PROCEDURE — 3080F DIAST BP >= 90 MM HG: CPT | Performed by: PHYSICIAN ASSISTANT

## 2023-07-05 RX ORDER — SULFAMETHOXAZOLE AND TRIMETHOPRIM 800; 160 MG/1; MG/1
1 TABLET ORAL 2 TIMES DAILY
Qty: 20 TABLET | Refills: 0 | Status: SHIPPED | OUTPATIENT
Start: 2023-07-05 | End: 2023-07-15

## 2023-07-05 RX ORDER — ALLOPURINOL 300 MG/1
300 TABLET ORAL DAILY
COMMUNITY

## 2023-07-05 RX ORDER — ERGOCALCIFEROL 1.25 MG/1
50000 CAPSULE ORAL WEEKLY
COMMUNITY
Start: 2023-06-23

## 2023-07-05 NOTE — TELEPHONE ENCOUNTER
From: Toshia Helms  To: Thea Ring PA-C  Sent: 7/5/2023 3:15 PM CDT  Subject: Test results from urine    So everything came back negative correct?     Kind Evelyn Poor

## 2023-07-06 ENCOUNTER — TELEPHONE (OUTPATIENT)
Dept: FAMILY MEDICINE CLINIC | Facility: CLINIC | Age: 58
End: 2023-07-06

## 2023-07-06 LAB
C TRACH DNA SPEC QL NAA+PROBE: NEGATIVE
N GONORRHOEA DNA SPEC QL NAA+PROBE: NEGATIVE

## 2023-07-06 NOTE — TELEPHONE ENCOUNTER
----- Message from Hakeem Yu PA-C sent at 7/6/2023 12:11 PM CDT -----  Patient needs to take antibiotic and is ok to return to work next Monday 7/10 ( not 7/18).

## 2023-07-06 NOTE — TELEPHONE ENCOUNTER
Attempted calling patient. Left VM advising patient to contact our office. Per provider she has denied the patient's request to extend his time off from work til 7/18/23. Per letter that was provided to patient at yesterdays visit return to work date is 7/13/23. [email or web address] message has also been sent to patient with providers advise.

## 2023-07-07 ENCOUNTER — TELEPHONE (OUTPATIENT)
Dept: FAMILY MEDICINE CLINIC | Facility: CLINIC | Age: 58
End: 2023-07-07

## 2023-12-15 RX ORDER — INDOMETHACIN 50 MG/1
50 CAPSULE ORAL
Qty: 30 CAPSULE | Refills: 2 | Status: SHIPPED | OUTPATIENT
Start: 2023-12-15

## 2023-12-15 NOTE — TELEPHONE ENCOUNTER
Refill passed per [facility] protocol.     Requested Prescriptions   Pending Prescriptions Disp Refills    indomethacin 50 MG Oral Cap 30 capsule 2     Sig: Take 1 capsule (50 mg total) by mouth 3 (three) times daily with meals. As needed for pain or inflammation from gout exacerbation.       Non-Narcotic Pain Medication Protocol Passed - 12/14/2023 10:36 AM        Passed - In person appointment or virtual visit in the past 6 mos or appointment in next 3 mos     Recent Outpatient Visits              5 months ago Pain in left testicle    [facility], [address], [place] Mookie Babin PA-C    Office Visit    11 months ago Hand dermatitis    [facility]    Office Visit    11 months ago Routine general medical examination at a health care facility    [facility], [address], [place] Mookie Babin PA-C    Office Visit    2 years ago Acute gout of left foot, unspecified cause    [facility]    Office Visit    3 years ago 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

## 2024-06-15 RX ORDER — INDOMETHACIN 50 MG/1
50 CAPSULE ORAL
Qty: 30 CAPSULE | Refills: 0 | Status: SHIPPED | OUTPATIENT
Start: 2024-06-15

## 2024-06-15 NOTE — TELEPHONE ENCOUNTER
LAST VISIT 7/5/23.   Approved per triage protocol, will need to schedule follow up appointment in order to get further refills.     No future appointments.  MyChart sent .     =please assist     Refill passed per [facility] protocol.     Requested Prescriptions   Pending Prescriptions Disp Refills    indomethacin 50 MG Oral Cap 30 capsule 2     Sig: Take 1 capsule (50 mg total) by mouth 3 (three) times daily with meals. As needed for pain or inflammation from gout exacerbation.       Non-Narcotic Pain Medication Protocol Failed - 6/12/2024  9:36 AM        Failed - In person appointment or virtual visit in the past 6 mos or appointment in next 3 mos     Recent Outpatient Visits              11 months ago [facility] left testicle    [facility]    Office Visit    1 year ago [facility]    Office Visit    1 year ago Routine general medical examination at a health care facility    [facility]    Office Visit    2 years ago Acute gout of left foot, unspecified cause    [facility]    Office Visit    4 years ago 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    [facility]    Office Visit                                 Recent Outpatient Visits              11 months ago Pain in left testicle    [facility]    Office Visit    1 year ago Hand dermatitis    [facility]    Office Visit    1 year ago Routine general medical examination at a health care facility    [facility], [place] Nguyen, Minhxuyen, PA-C    Office Visit    2 years ago Acute gout of left foot, unspecified cause    [facility], [facility], Suri Stevens PA-C    Office Visit    4 years ago 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

## 2024-07-17 ENCOUNTER — TELEPHONE (OUTPATIENT)
Dept: FAMILY MEDICINE CLINIC | Facility: CLINIC | Age: 59
End: 2024-07-17

## 2024-07-17 RX ORDER — CLOBETASOL PROPIONATE 0.5 MG/G
OINTMENT TOPICAL
Qty: 60 G | Refills: 1 | OUTPATIENT
Start: 2024-07-17

## 2024-07-19 ENCOUNTER — TELEPHONE (OUTPATIENT)
Dept: FAMILY MEDICINE CLINIC | Facility: CLINIC | Age: 59
End: 2024-07-19

## 2024-07-19 NOTE — TELEPHONE ENCOUNTER
Patient requesting refill on medication below said he requested in 7/17, patient completely out       [id number] - [place], [place] - [address]      Medication Detail    Medication Quantity Refills Start End   indomethacin 50 MG Oral Cap 30 capsule 0 6/15/2024 --   Sig:   Take 1 capsule (50 mg total) by mouth 3 (three) times daily with meals. As needed for pain or inflammation from gout exacerbation.     Route:   Oral     Order #:   [id number]

## 2024-07-22 RX ORDER — INDOMETHACIN 50 MG/1
50 CAPSULE ORAL
Qty: 30 CAPSULE | Refills: 0 | OUTPATIENT
Start: 2024-07-22

## 2024-07-22 RX ORDER — INDOMETHACIN 50 MG/1
50 CAPSULE ORAL
Refills: 0 | OUTPATIENT
Start: 2024-07-22

## 2024-07-22 NOTE — TELEPHONE ENCOUNTER
Please review. Protocol Failed; No Protocol    MyChart message sent to patient to schedule an office visit with Provider and/or  Routed to Patient  for assistance with appointment.     Requested Prescriptions   Pending Prescriptions Disp Refills    indomethacin 50 MG Oral Cap 30 capsule 0     Sig: Take 1 capsule (50 mg total) by mouth 3 (three) times daily with meals. As needed for pain or inflammation from gout exacerbation.       Non-Narcotic Pain Medication Protocol Failed - 7/17/2024  9:18 AM        Failed - In person appointment or virtual visit in the past 6 mos or appointment in next 3 mos     Recent Outpatient Visits              1 year ago Pain in left testicle    [facility], [place] Nguyen, Minhxuyen, PA-C    Office Visit    1 year ago Hand dermatitis    [facility] [place]    Office Visit    1 year ago Routine general medical examination at a health care facility    [facility], [place] Nguyen, Minhxuyen, PA-C    Office Visit    2 years ago Acute gout of left foot, unspecified cause    [facility] [place]    Office Visit    4 years ago 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    [facility], [place] Nguyen, Minhxuyen, PA-C    Office Visit                               Recent Outpatient Visits              1 year ago Pain in left testicle    [facility], [place] Nguyen, Minhxuyen, PA-C    Office Visit    1 year ago Hand dermatitis    [facility] [place]    Office Visit    1 year ago Routine general medical examination at a health care facility    [facility], [place] Nguyen, Minhxuyen, PA-C    Office Visit    2 years  ago Acute gout of left foot, unspecified cause    [facility], [facility], Suri Stevens PA-C    Office Visit    4 years ago 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

## 2024-07-24 NOTE — TELEPHONE ENCOUNTER
Talked to patient, offered him an appointment but he says he will make the appointment thru his MyChart.

## 2025-01-23 ENCOUNTER — OFFICE VISIT (OUTPATIENT)
Dept: FAMILY MEDICINE CLINIC | Facility: CLINIC | Age: 60
End: 2025-01-23
Payer: COMMERCIAL

## 2025-01-23 VITALS
WEIGHT: 217 LBS | DIASTOLIC BLOOD PRESSURE: 86 MMHG | HEIGHT: 70 IN | SYSTOLIC BLOOD PRESSURE: 135 MMHG | HEART RATE: 62 BPM | BODY MASS INDEX: 31.07 KG/M2

## 2025-01-23 DIAGNOSIS — M10.00 IDIOPATHIC GOUT, UNSPECIFIED CHRONICITY, UNSPECIFIED SITE: ICD-10-CM

## 2025-01-23 DIAGNOSIS — E66.811 CLASS 1 OBESITY DUE TO EXCESS CALORIES WITHOUT SERIOUS COMORBIDITY WITH BODY MASS INDEX (BMI) OF 31.0 TO 31.9 IN ADULT: ICD-10-CM

## 2025-01-23 DIAGNOSIS — M25.521 RIGHT ELBOW PAIN: ICD-10-CM

## 2025-01-23 DIAGNOSIS — E78.2 MIXED HYPERLIPIDEMIA: ICD-10-CM

## 2025-01-23 DIAGNOSIS — Z12.5 SCREENING FOR MALIGNANT NEOPLASM OF PROSTATE: ICD-10-CM

## 2025-01-23 DIAGNOSIS — N40.0 BENIGN PROSTATIC HYPERPLASIA WITHOUT LOWER URINARY TRACT SYMPTOMS: ICD-10-CM

## 2025-01-23 DIAGNOSIS — R35.0 URINARY FREQUENCY: Primary | ICD-10-CM

## 2025-01-23 DIAGNOSIS — Z12.11 SCREENING FOR MALIGNANT NEOPLASM OF COLON: ICD-10-CM

## 2025-01-23 DIAGNOSIS — E66.09 CLASS 1 OBESITY DUE TO EXCESS CALORIES WITHOUT SERIOUS COMORBIDITY WITH BODY MASS INDEX (BMI) OF 31.0 TO 31.9 IN ADULT: ICD-10-CM

## 2025-01-23 LAB
APPEARANCE: CLEAR
BILIRUBIN: NEGATIVE
GLUCOSE (URINE DIPSTICK): NEGATIVE MG/DL
KETONES (URINE DIPSTICK): NEGATIVE MG/DL
LEUKOCYTES: NEGATIVE
MULTISTIX LOT#: NORMAL NUMERIC
NITRITE, URINE: NEGATIVE
OCCULT BLOOD: NEGATIVE
PH, URINE: 6 (ref 4.5–8)
PROTEIN (URINE DIPSTICK): NEGATIVE MG/DL
SPECIFIC GRAVITY: 1.02 (ref 1–1.03)
URINE-COLOR: YELLOW
UROBILINOGEN,SEMI-QN: 0.2 MG/DL (ref 0–1.9)

## 2025-01-23 PROCEDURE — 3079F DIAST BP 80-89 MM HG: CPT | Performed by: PHYSICIAN ASSISTANT

## 2025-01-23 PROCEDURE — 3008F BODY MASS INDEX DOCD: CPT | Performed by: PHYSICIAN ASSISTANT

## 2025-01-23 PROCEDURE — 99214 OFFICE O/P EST MOD 30 MIN: CPT | Performed by: PHYSICIAN ASSISTANT

## 2025-01-23 PROCEDURE — 3075F SYST BP GE 130 - 139MM HG: CPT | Performed by: PHYSICIAN ASSISTANT

## 2025-01-23 PROCEDURE — 81003 URINALYSIS AUTO W/O SCOPE: CPT | Performed by: PHYSICIAN ASSISTANT

## 2025-01-23 NOTE — ASSESSMENT & PLAN NOTE
Orders:    CBC With Differential With Platelet; Future    Comp Metabolic Panel (14); Future    Hemoglobin A1C; Future    Lipid Panel; Future    TSH W Reflex To Free T4; Future

## 2025-01-23 NOTE — PROGRESS NOTES
HPI:     HPI  A 59-year-old man presents with urinary frequency for the past 6 months. He has 2 times of urination during sleep. He has a weak stream when urination. He started having right elbow pain and swelling this morning. The pain is localized. He tolerates pain.   He denies fever, dysuria, blood in urine, N/V, or abdominal pain.      Medications:     Current Outpatient Medications   Medication Sig Dispense Refill    indomethacin 50 MG Oral Cap Take 1 capsule (50 mg total) by mouth 3 (three) times daily with meals. As needed for pain or inflammation from gout exacerbation. 30 capsule 0    ergocalciferol 1.25 MG (07685 UT) Oral Cap Take 1 capsule (50,000 Units total) by mouth once a week.      allopurinol 300 MG Oral Tab Take 1 tablet (300 mg total) by mouth daily.      clobetasol 0.05 % External Ointment Use twice daily to hands Monday-Friday. Take weekends off. 60 g 1    Vardenafil HCl 10 MG Oral Tab Take 0.5 tablets (5 mg total) by mouth as needed for Erectile Dysfunction. 30 tablet 0    triamcinolone 0.1 % External Ointment Apply to affected area twice a day 80 g 1       Allergies:   Allergies[1]    History:     Health Maintenance   Topic Date Due    Colorectal Cancer Screening  Never done    Pneumococcal Vaccine: 50+ Years (1 of 1 - PCV) Never done    Zoster Vaccines (1 of 2) Never done    Annual Physical  01/05/2024    COVID-19 Vaccine (1 - 2024-25 season) Never done    Influenza Vaccine (1) Never done    Annual Depression Screening  01/01/2025    PSA  01/05/2025    DTaP,Tdap,and Td Vaccines (2 - Td or Tdap) 11/19/2031    Meningococcal B Vaccine  Aged Out       No LMP for male patient.   Past Medical History:     Past Medical History:    Lipid screening    Per [employer]    Right knee injury       Past Surgical History:     Past Surgical History:   Procedure Laterality Date    Musculoskeletal surgery unlisted      Per [employer]:  \"B/L knee ACL repair.\"       Family History:     Family History   Problem  Relation Age of Onset    Diabetes Other         Family h/o    Heart Disease Father         Coronary Artery Disease    Diabetes Father     Other (Other) Father     Other (Other) Brother         Gout    Diabetes Mother        Social History:     Social History     Socioeconomic History    Marital status:      Spouse name: Not on file    Number of children: Not on file    Years of education: Not on file    Highest education level: Not on file   Occupational History    Not on file   Tobacco Use    Smoking status: Never    Smokeless tobacco: Never   Vaping Use    Vaping status: Never Used   Substance and Sexual Activity    Alcohol use: Yes     Alcohol/week: 0.0 standard drinks of alcohol     Comment: (beer & liquor) socially    Drug use: Never    Sexual activity: Not on file   Other Topics Concern     Service Not Asked    Blood Transfusions Not Asked    Caffeine Concern Yes     Comment: Coffee, 1 cup daily    Occupational Exposure Not Asked    Hobby Hazards Not Asked    Sleep Concern Not Asked    Stress Concern Not Asked    Weight Concern Not Asked    Special Diet Not Asked    Back Care Not Asked    Exercise Not Asked    Bike Helmet Not Asked    Seat Belt Not Asked    Self-Exams Not Asked    Grew up on a farm Not Asked    History of tanning Not Asked    Outdoor occupation Not Asked    Reaction to local anesthetic No    Pt has a pacemaker No    Pt has a defibrillator No   Social History Narrative    Not on file     Social Drivers of Health     Financial Resource Strain: Not on file   Food Insecurity: Not on file   Transportation Needs: Not on file   Physical Activity: Not on file   Stress: Not on file   Social Connections: Not on file   Housing Stability: At Risk (8/18/2023)    Received from [facility]     Living Situation: Not on file     Housing Problems: Not on file       Review of Systems:   Review of Systems   Genitourinary:  Positive for frequency. Negative for dysuria, flank pain and  hematuria.        Vitals:    01/23/25 1319   BP: 135/86   Pulse: 62   Weight: 217 lb (98.4 kg)   Height: 5' 10\" (1.778 m)     Body mass index is 31.14 kg/m².    Physical Exam:   Physical Exam  Vitals reviewed.   Constitutional:       General: He is not in acute distress.     Appearance: He is well-developed.   HENT:      Head: Normocephalic and atraumatic.      Right Ear: External ear normal.      Left Ear: External ear normal.      Nose: Nose normal.   Eyes:      General:         Right eye: No discharge.         Left eye: No discharge.      Conjunctiva/sclera: Conjunctivae normal.   Cardiovascular:      Rate and Rhythm: Regular rhythm.      Heart sounds: Normal heart sounds, S1 normal and S2 normal. No murmur heard.  Pulmonary:      Effort: Pulmonary effort is normal.      Breath sounds: Normal breath sounds. No wheezing or rales.   Chest:      Chest wall: No tenderness.   Abdominal:      General: Abdomen is flat. Bowel sounds are normal. There is no distension.      Palpations: Abdomen is soft.      Tenderness: There is no abdominal tenderness.   Lymphadenopathy:      Cervical: No cervical adenopathy.   Skin:     Findings: No rash.   Neurological:      Mental Status: He is alert and oriented to person, place, and time.   Psychiatric:         Behavior: Behavior is cooperative.      Right elbow: no erythema, no tenderness to palpation, + swelling.    Assessment and Plan::     Assessment & Plan  Urinary frequency  Advise to do Kegel exercise. RTC prn if symptom persists.    Orders:    POC Urinalysis, Automated Dip without microscopy (PCA and EMMG ONLY) [48821]    Mixed hyperlipidemia    Orders:    CT CALCIUM SCORING; Future    CBC With Differential With Platelet; Future    Comp Metabolic Panel (14); Future    Hemoglobin A1C; Future    Lipid Panel; Future    TSH W Reflex To Free T4; Future    Screening for malignant neoplasm of colon    Orders:    Gastro GI Telephone Colon Screen; Future    Class 1 obesity due to  excess calories without serious comorbidity with body mass index (BMI) of 31.0 to 31.9 in adult    Orders:    CBC With Differential With Platelet; Future    Comp Metabolic Panel (14); Future    Hemoglobin A1C; Future    Lipid Panel; Future    TSH W Reflex To Free T4; Future    Idiopathic gout, unspecified chronicity, unspecified site    Orders:    Uric Acid; Future  Continue with Allopurinol 300 mg PO every day.  Screening for malignant neoplasm of prostate    Orders:    PSA Total, Screen; Future    Benign prostatic hyperplasia without lower urinary tract symptoms    Orders:    US PROSTATE (TRANSRECTAL) (CPT=76872); Future    Right elbow pain  Advise patient to take Tylenol or Ibuprofen as needed for pain.  Advise the patient to apply ice compresses. May refer to Ortho if symptom worsen.          Discussed plan of care with pt and pt is in agreement.All questions answered. Pt to call with questions or concerns.         [1]   Allergies  Allergen Reactions    Fish-Derived Products HIVES     Catfish

## 2025-01-24 ENCOUNTER — TELEPHONE (OUTPATIENT)
Dept: FAMILY MEDICINE CLINIC | Facility: CLINIC | Age: 60
End: 2025-01-24

## 2025-01-24 ENCOUNTER — LAB ENCOUNTER (OUTPATIENT)
Dept: LAB | Age: 60
End: 2025-01-24
Attending: PHYSICIAN ASSISTANT
Payer: COMMERCIAL

## 2025-01-24 DIAGNOSIS — E66.09 CLASS 1 OBESITY DUE TO EXCESS CALORIES WITHOUT SERIOUS COMORBIDITY WITH BODY MASS INDEX (BMI) OF 31.0 TO 31.9 IN ADULT: ICD-10-CM

## 2025-01-24 DIAGNOSIS — Z12.5 SCREENING FOR MALIGNANT NEOPLASM OF PROSTATE: ICD-10-CM

## 2025-01-24 DIAGNOSIS — E78.2 MIXED HYPERLIPIDEMIA: ICD-10-CM

## 2025-01-24 DIAGNOSIS — M25.521 RIGHT ELBOW PAIN: Primary | ICD-10-CM

## 2025-01-24 DIAGNOSIS — E66.811 CLASS 1 OBESITY DUE TO EXCESS CALORIES WITHOUT SERIOUS COMORBIDITY WITH BODY MASS INDEX (BMI) OF 31.0 TO 31.9 IN ADULT: ICD-10-CM

## 2025-01-24 DIAGNOSIS — M10.00 IDIOPATHIC GOUT, UNSPECIFIED CHRONICITY, UNSPECIFIED SITE: ICD-10-CM

## 2025-01-24 LAB
ALBUMIN SERPL-MCNC: 4.6 G/DL (ref 3.2–4.8)
ALBUMIN/GLOB SERPL: 1.8 {RATIO} (ref 1–2)
ALP LIVER SERPL-CCNC: 74 U/L
ALT SERPL-CCNC: 25 U/L
ANION GAP SERPL CALC-SCNC: 8 MMOL/L (ref 0–18)
AST SERPL-CCNC: 21 U/L (ref ?–34)
BASOPHILS # BLD AUTO: 0.05 X10(3) UL (ref 0–0.2)
BASOPHILS NFR BLD AUTO: 0.9 %
BILIRUB SERPL-MCNC: 0.4 MG/DL (ref 0.3–1.2)
BUN BLD-MCNC: 13 MG/DL (ref 9–23)
BUN/CREAT SERPL: 12.4 (ref 10–20)
CALCIUM BLD-MCNC: 9.6 MG/DL (ref 8.7–10.4)
CHLORIDE SERPL-SCNC: 105 MMOL/L (ref 98–112)
CHOLEST SERPL-MCNC: 234 MG/DL (ref ?–200)
CO2 SERPL-SCNC: 27 MMOL/L (ref 21–32)
COMPLEXED PSA SERPL-MCNC: 1.87 NG/ML (ref ?–4)
CREAT BLD-MCNC: 1.05 MG/DL
DEPRECATED RDW RBC AUTO: 40.8 FL (ref 35.1–46.3)
EGFRCR SERPLBLD CKD-EPI 2021: 83 ML/MIN/1.73M2 (ref 60–?)
EOSINOPHIL # BLD AUTO: 0.18 X10(3) UL (ref 0–0.7)
EOSINOPHIL NFR BLD AUTO: 3.1 %
ERYTHROCYTE [DISTWIDTH] IN BLOOD BY AUTOMATED COUNT: 13.1 % (ref 11–15)
EST. AVERAGE GLUCOSE BLD GHB EST-MCNC: 114 MG/DL (ref 68–126)
FASTING PATIENT LIPID ANSWER: YES
FASTING STATUS PATIENT QL REPORTED: YES
GLOBULIN PLAS-MCNC: 2.5 G/DL (ref 2–3.5)
GLUCOSE BLD-MCNC: 95 MG/DL (ref 70–99)
HBA1C MFR BLD: 5.6 % (ref ?–5.7)
HCT VFR BLD AUTO: 43.8 %
HDLC SERPL-MCNC: 41 MG/DL (ref 40–59)
HGB BLD-MCNC: 14.7 G/DL
IMM GRANULOCYTES # BLD AUTO: 0.01 X10(3) UL (ref 0–1)
IMM GRANULOCYTES NFR BLD: 0.2 %
LDLC SERPL CALC-MCNC: 132 MG/DL (ref ?–100)
LYMPHOCYTES # BLD AUTO: 2.36 X10(3) UL (ref 1–4)
LYMPHOCYTES NFR BLD AUTO: 41.3 %
MCH RBC QN AUTO: 28.7 PG (ref 26–34)
MCHC RBC AUTO-ENTMCNC: 33.6 G/DL (ref 31–37)
MCV RBC AUTO: 85.5 FL
MONOCYTES # BLD AUTO: 0.38 X10(3) UL (ref 0.1–1)
MONOCYTES NFR BLD AUTO: 6.6 %
NEUTROPHILS # BLD AUTO: 2.74 X10 (3) UL (ref 1.5–7.7)
NEUTROPHILS # BLD AUTO: 2.74 X10(3) UL (ref 1.5–7.7)
NEUTROPHILS NFR BLD AUTO: 47.9 %
NONHDLC SERPL-MCNC: 193 MG/DL (ref ?–130)
OSMOLALITY SERPL CALC.SUM OF ELEC: 290 MOSM/KG (ref 275–295)
PLATELET # BLD AUTO: 232 10(3)UL (ref 150–450)
POTASSIUM SERPL-SCNC: 4.6 MMOL/L (ref 3.5–5.1)
PROT SERPL-MCNC: 7.1 G/DL (ref 5.7–8.2)
RBC # BLD AUTO: 5.12 X10(6)UL
SODIUM SERPL-SCNC: 140 MMOL/L (ref 136–145)
TRIGL SERPL-MCNC: 341 MG/DL (ref 30–149)
TSI SER-ACNC: 2.13 UIU/ML (ref 0.55–4.78)
URATE SERPL-MCNC: 8 MG/DL
VLDLC SERPL CALC-MCNC: 63 MG/DL (ref 0–30)
WBC # BLD AUTO: 5.7 X10(3) UL (ref 4–11)

## 2025-01-24 PROCEDURE — 84443 ASSAY THYROID STIM HORMONE: CPT

## 2025-01-24 PROCEDURE — 80061 LIPID PANEL: CPT

## 2025-01-24 PROCEDURE — 83036 HEMOGLOBIN GLYCOSYLATED A1C: CPT

## 2025-01-24 PROCEDURE — 80053 COMPREHEN METABOLIC PANEL: CPT

## 2025-01-24 PROCEDURE — 84550 ASSAY OF BLOOD/URIC ACID: CPT

## 2025-01-24 PROCEDURE — 36415 COLL VENOUS BLD VENIPUNCTURE: CPT

## 2025-01-24 PROCEDURE — 85025 COMPLETE CBC W/AUTO DIFF WBC: CPT

## 2025-01-24 RX ORDER — NAPROXEN 500 MG/1
500 TABLET ORAL 2 TIMES DAILY WITH MEALS
Qty: 60 TABLET | Refills: 0 | Status: SHIPPED | OUTPATIENT
Start: 2025-01-24

## 2025-01-24 NOTE — TELEPHONE ENCOUNTER
Pt stated he was seen yesterday was advised to call back if still have pain -right elbow , tender to touch/swelling, icing , requesting pain med , taking ibuprofen

## 2025-01-24 NOTE — TELEPHONE ENCOUNTER
Patient was called and inform of Min message below and he verbalized understanding.   Patient was given the information to Ortho

## 2025-01-25 ENCOUNTER — TELEPHONE (OUTPATIENT)
Dept: FAMILY MEDICINE CLINIC | Facility: CLINIC | Age: 60
End: 2025-01-25

## 2025-01-25 NOTE — TELEPHONE ENCOUNTER
Patient calling for pain medication for his elbow.     Patient saw MK Babin  Thursday, 1/23 for his condition.       indomethacin 50 MG Oral Cap     [facility]/pharmacy #[id number] - [place], [place] - [address] [phone], [phone]

## 2025-01-27 RX ORDER — INDOMETHACIN 50 MG/1
50 CAPSULE ORAL
Qty: 30 CAPSULE | Refills: 0 | Status: SHIPPED | OUTPATIENT
Start: 2025-01-27

## 2025-01-27 NOTE — TELEPHONE ENCOUNTER
Pt stated he mentioned at last OV,right elbow has swelling-icing, taking naproxen/ibuprofen, no relief     Advised ortho referral-relayed info to eli- advised need an eval even though he thinks it's gout, takes allopurinol daily , encouraged to call , verbalized understanding     Requesting rx

## 2025-01-27 NOTE — TELEPHONE ENCOUNTER
Nursing Staff. Please call patient.     See patient Vel message below:    \"Patient Comment: I believe I have gout in the right elbow I took the last 2 pills I had the night before the blood work was done. Please refill or provide me with some sort of pain reliver as my pain is extemely bad\"    See patient recent Uric Acid Lab :     \"Alexandra Reid has reviewed your results and indicates the below:  1. Hyperlipidemia: Start Crestor 10 mg by mouth at bedtime. Script has been sent to your pharmacy for this to start. If trouble filling, please let us know. Also, Recheck fasting lipid panel in 3 months. Orders have been placed for you to have these done. Fast 10-12 hours prior to draw, please drink water prior to testing. No appointment needed, you can just walk in.  2. The patient is not diabetic.  3. CBC showed no anemia.  4. Sugar, liver function, kidney function, PSA, uric acid (gout) and thyroid are normal.     Any questions, please call us at [phone].     Thank you,  Madelaine ASHTON RN\"

## 2025-01-31 ENCOUNTER — TELEPHONE (OUTPATIENT)
Facility: CLINIC | Age: 60
End: 2025-01-31

## 2025-01-31 ENCOUNTER — OFFICE VISIT (OUTPATIENT)
Facility: CLINIC | Age: 60
End: 2025-01-31
Payer: COMMERCIAL

## 2025-01-31 VITALS
SYSTOLIC BLOOD PRESSURE: 150 MMHG | WEIGHT: 217 LBS | BODY MASS INDEX: 31.07 KG/M2 | HEIGHT: 70 IN | DIASTOLIC BLOOD PRESSURE: 100 MMHG | HEART RATE: 70 BPM

## 2025-01-31 DIAGNOSIS — Z12.11 SCREEN FOR COLON CANCER: Primary | ICD-10-CM

## 2025-01-31 DIAGNOSIS — Z12.11 COLON CANCER SCREENING: Primary | ICD-10-CM

## 2025-01-31 NOTE — TELEPHONE ENCOUNTER
Scheduled for:  Colonoscopy [address]9815  Provider Name:  Dr. Kincaid  Date:  2/14/2025  Location:  [facility]  Sedation:  MAC  Time:  8:00 am - Patient is aware he/she will receive a phone call the day before with the arrival time.  Prep:  Golytely  Meds/Allergies Reconciled?:  Physician reviewed  Diagnosis with codes:  Screening for colon cancer Z12.11  Was patient informed to call insurance with codes (Y/N):  Yes  Referral sent?:  Referral was sent at the time of electronic surgical scheduling.  [facility] or [facility] notified?:  I sent an electronic request to Endo Scheduling and received a confirmation today.    Medication Orders:  A. Hold naproxen and indomethacin x 7 days before the procedure   Misc Orders:  N/A     Further instructions given by staff:  Prep instructions were given to pt in the office, pt verbalized understanding.

## 2025-01-31 NOTE — TELEPHONE ENCOUNTER
PPD -    Patient is scheduled in two weeks on 2/14/25 for colonoscopy. Please check for PA. Thank you!

## 2025-01-31 NOTE — H&P
[facility] - Gastroenterology                                                                                                  Clinic History and Physical       Reason for consult: colon cancer screening    Requesting physician or provider: No primary care provider on file.    Chief Complaint   Patient presents with    Colonoscopy Screening     No prior colon; no family hx of colon cancer       HPI:   Bk Vasquez is a 59 year old year-old male who presents for colon cancer screening evaluation.    Patient denies any GI symptoms of nausea, vomiting, dyspepsia, dysphagia, hematemesis, abdominal pain, change in bowel habits, thin stools, hematochezia, or melena.  Additionally there is no weight loss and no reported history of chest pain or shortness of breath.  -no family history of colon cancer.  -no significant constipation issues.    Prior endoscopies:  none    Soc:  -No smoking  -yes Etoh    History, Medications, Allergies, ROS:      Past Medical History:    Constipation    Lipid screening    Per [employer]    Right knee injury      Past Surgical History:   Procedure Laterality Date    Musculoskeletal surgery unlisted      Per [employer]:  \"B/L knee ACL repair.\"      Family Hx:   Family History   Problem Relation Age of Onset    Diabetes Other         Family h/o    Heart Disease Father         Coronary Artery Disease    Diabetes Father     Other (Other) Father     Other (Other) Brother         Gout    Diabetes Mother       Social History:   Social History     Socioeconomic History    Marital status:    Tobacco Use    Smoking status: Never    Smokeless tobacco: Never    Tobacco comments:     quit in 2013   Vaping Use    Vaping status: Never Used   Substance and Sexual Activity    Alcohol use: Yes     Comment: maybe once a week    Drug use: Never   Other Topics Concern    Caffeine Concern Yes     Comment: Coffee, 1 cup daily    Reaction to local anesthetic No    Pt has a pacemaker No    Pt has a defibrillator  No     Social Drivers of Health      Received from [facility]        Medications (Active prior to today's visit):  Current Outpatient Medications   Medication Sig Dispense Refill    indomethacin 50 MG Oral Cap Take 1 capsule (50 mg total) by mouth 3 (three) times daily with meals. As needed for pain or inflammation from gout exacerbation. 30 capsule 0    naproxen 500 MG Oral Tab Take 1 tablet (500 mg total) by mouth 2 (two) times daily with meals. 60 tablet 0    rosuvastatin 10 MG Oral Tab Take 1 tablet (10 mg total) by mouth nightly. 90 tablet 3    allopurinol 300 MG Oral Tab Take 1 tablet (300 mg total) by mouth daily.      clobetasol 0.05 % External Ointment Use twice daily to hands Monday-Friday. Take weekends off. 60 g 1    Vardenafil HCl 10 MG Oral Tab Take 0.5 tablets (5 mg total) by mouth as needed for Erectile Dysfunction. 30 tablet 0    triamcinolone 0.1 % External Ointment Apply to affected area twice a day 80 g 1       Allergies:  Allergies[1]    ROS:   CONSTITUTIONAL:  negative for fevers, rigors  EYES:  negative for diplopia   RESPIRATORY:  negative for severe shortness of breath  CARDIOVASCULAR:  negative for crushing sub-sternal chest pain  GASTROINTESTINAL:  see HPI  GENITOURINARY:  negative for dysuria or gross hematuria  INTEGUMENT/BREAST:  SKIN:  negative for jaundice   ALLERGIC/IMMUNOLOGIC:  negative for hay fever  ENDOCRINE:  negative for cold intolerance and heat intolerance  MUSCULOSKELETAL:  negative for joint effusion/severe erythema  BEHAVIOR/PSYCH:  negative for psychotic behavior      PHYSICAL EXAM:   Blood pressure (!) 150/100, pulse 70, height 5' 10\" (1.778 m), weight 217 lb (98.4 kg).    Gen- Patient appears comfortable and in no acute discomfort  HEENT: the sclera appears anicteric, oropharynx clear, mucus membranes appear moist  CV- regular rate and rhythm, the extremities are warm and well perfused   Lung- Moves air well; No labored breathing  Abdomen- soft,  non-distended  Skin- No jaundice  Ext: no edema is evident.   Neuro- Alert and interactive, and gross movements of extremities normal    Labs/Imaging:     Patient's labs and imaging were reviewed and discussed with patient today.      ASSESSMENT/PLAN:   Bk Vasquez is a 59 year old year-old male who presents for colon cancer screening evaluation. No anemia noted on lab work.    # Average Risk screening: patient is considered average risk for colon cancer (no family hx of colon cancer) and it is appropriate to proceed with screening colonoscopy. Patient is currently asymptomatic and denies diarrhea, hematochezia, thin-stools or weight loss. We discussed risks/benefits/alternatives to procedure, including CT colonography and stool testing, they want to proceed with colonoscopy.    Recommend:  -Schedule colonoscopy w/ MAC  -Prep: split dose golytely    Colonoscopy consent: I have discussed the risks, benefits, and alternatives to colonoscopy with the patient [who demonstrated understanding], including but not limited to the risks of bleeding, infection, pain, as well as the risks of anesthesia and perforation all leading to prolonged hospitalization, surgical intervention. I also specifically mentioned the miss rate of colonoscopy of 5-10% in the best of all circumstances. All questions were answered to the patient’s satisfaction. The patient elected to proceed with colonoscopy with intervention [i.e. polypectomy, etc.] as indicated.    Orders This Visit:  No orders of the defined types were placed in this encounter.      Meds This Visit:  Requested Prescriptions      No prescriptions requested or ordered in this encounter       Imaging & Referrals:  None       Gigi Kincaid MD  [facility]  1/31/2025           [1]   Allergies  Allergen Reactions    Fish-Derived Products HIVES     Catfish

## 2025-01-31 NOTE — PATIENT INSTRUCTIONS
1. Schedule colonoscopy with MAC [Diagnosis: colon cancer screening]    2.  bowel prep from pharmacy (split dose golytely)    3. Medication adjustment:       A. Hold naproxen and indomethacin x 7 days before the procedure    4. Read all bowel prep instructions carefully    5. AVOID seeds, nuts, popcorn, raw fruits and vegetables (cooked is okay) for 2-3 days before procedure    6. If you start any NEW medication after your visit today, please notify us. Certain medications will need to be held before the procedure, or the procedure cannot be performed.

## 2025-02-02 ENCOUNTER — PATIENT MESSAGE (OUTPATIENT)
Dept: FAMILY MEDICINE CLINIC | Facility: CLINIC | Age: 60
End: 2025-02-02

## 2025-02-02 DIAGNOSIS — M25.521 RIGHT ELBOW PAIN: Primary | ICD-10-CM

## 2025-02-03 NOTE — TELEPHONE ENCOUNTER
Patient was seen on 1/23/25. Do you want to refer to ortho? Referral pending.     Right elbow pain  Advise patient to take Tylenol or Ibuprofen as needed for pain.  Advise the patient to apply ice compresses. May refer to Ortho if symptom worsen.

## 2025-02-04 NOTE — TELEPHONE ENCOUNTER
Per Jose, call patient and advised to schedule follow up appointment to discuss symptoms. Dr. Moralez has an opening at 1:50pm today.     Left message to call back.

## 2025-02-04 NOTE — TELEPHONE ENCOUNTER
Patient called, verified Name and . States he was seen in the Urgent Care on  to address his symptom. He was given intravenous fluids and medication for infection. States right elbow seems to be getting better. Antibiotic is good for 10 days. He was advised to follow up with Ortho if there is no improvement. [facility]. Patient verbalized understanding and had no further questions at this time.

## 2025-02-06 ENCOUNTER — PATIENT MESSAGE (OUTPATIENT)
Facility: CLINIC | Age: 60
End: 2025-02-06

## 2025-02-07 RX ORDER — BIOFLAVONOIDS 1000 MG
TABLET ORAL
COMMUNITY

## 2025-02-07 NOTE — TELEPHONE ENCOUNTER
Dr. Kincaid, please see patient message below. He was started on Cefodroxil on 2/2/24 for right elbow bursitis to take for 10 days. Last day on the antibiotic is 2/12/25    Called and spoke to patient, he reports his elbow is much better, less red and swelling subsiding. Denies fever or chills.He is scheduled for his colonoscopy on Feb. 14th. Can he continue to take the antibiotic prior to his colonoscopy. Thank you.

## 2025-02-14 ENCOUNTER — ANESTHESIA EVENT (OUTPATIENT)
Dept: ENDOSCOPY | Facility: HOSPITAL | Age: 60
End: 2025-02-14
Payer: COMMERCIAL

## 2025-02-14 ENCOUNTER — ANESTHESIA (OUTPATIENT)
Dept: ENDOSCOPY | Facility: HOSPITAL | Age: 60
End: 2025-02-14
Payer: COMMERCIAL

## 2025-02-14 ENCOUNTER — HOSPITAL ENCOUNTER (OUTPATIENT)
Facility: HOSPITAL | Age: 60
Setting detail: HOSPITAL OUTPATIENT SURGERY
Discharge: HOME | End: 2025-02-14
Attending: STUDENT IN AN ORGANIZED HEALTH CARE EDUCATION/TRAINING PROGRAM | Admitting: STUDENT IN AN ORGANIZED HEALTH CARE EDUCATION/TRAINING PROGRAM
Payer: COMMERCIAL

## 2025-02-14 VITALS
HEART RATE: 69 BPM | OXYGEN SATURATION: 96 % | RESPIRATION RATE: 14 BRPM | BODY MASS INDEX: 31.07 KG/M2 | DIASTOLIC BLOOD PRESSURE: 86 MMHG | WEIGHT: 217 LBS | SYSTOLIC BLOOD PRESSURE: 118 MMHG | HEIGHT: 70 IN

## 2025-02-14 DIAGNOSIS — Z12.11 SCREEN FOR COLON CANCER: ICD-10-CM

## 2025-02-14 PROCEDURE — 0DBN8ZX EXCISION OF SIGMOID COLON, VIA NATURAL OR ARTIFICIAL OPENING ENDOSCOPIC, DIAGNOSTIC: ICD-10-PCS | Performed by: STUDENT IN AN ORGANIZED HEALTH CARE EDUCATION/TRAINING PROGRAM

## 2025-02-14 PROCEDURE — 45380 COLONOSCOPY AND BIOPSY: CPT | Performed by: STUDENT IN AN ORGANIZED HEALTH CARE EDUCATION/TRAINING PROGRAM

## 2025-02-14 RX ORDER — SODIUM CHLORIDE, SODIUM LACTATE, POTASSIUM CHLORIDE, CALCIUM CHLORIDE 600; 310; 30; 20 MG/100ML; MG/100ML; MG/100ML; MG/100ML
INJECTION, SOLUTION INTRAVENOUS CONTINUOUS
Status: DISCONTINUED | OUTPATIENT
Start: 2025-02-14 | End: 2025-02-14

## 2025-02-14 RX ORDER — LIDOCAINE HYDROCHLORIDE 10 MG/ML
INJECTION, SOLUTION EPIDURAL; INFILTRATION; INTRACAUDAL; PERINEURAL AS NEEDED
Status: DISCONTINUED | OUTPATIENT
Start: 2025-02-14 | End: 2025-02-14 | Stop reason: SURG

## 2025-02-14 RX ADMIN — LIDOCAINE HYDROCHLORIDE 40 MG: 10 INJECTION, SOLUTION EPIDURAL; INFILTRATION; INTRACAUDAL; PERINEURAL at 07:55:00

## 2025-02-14 RX ADMIN — SODIUM CHLORIDE, SODIUM LACTATE, POTASSIUM CHLORIDE, CALCIUM CHLORIDE: 600; 310; 30; 20 INJECTION, SOLUTION INTRAVENOUS at 08:21:00

## 2025-02-14 RX ADMIN — SODIUM CHLORIDE, SODIUM LACTATE, POTASSIUM CHLORIDE, CALCIUM CHLORIDE: 600; 310; 30; 20 INJECTION, SOLUTION INTRAVENOUS at 07:52:00

## 2025-02-14 NOTE — DISCHARGE INSTRUCTIONS
Home Care Instructions for Colonoscopy  with Sedation    Diet:  - Resume your regular diet as tolerated unless otherwise instructed.  - Start with light meals to minimize bloating.  - Do not drink alcohol today.    Medication:  - If you have questions about resuming your normal medications, please contact your Primary Care Physician.    Activities:  - Take it easy today. Do not return to work today.  - Do not drive today.  - Do not operate any machinery today (including kitchen equipment).    Colonoscopy:  - You may notice some rectal \"spotting\" (a little blood on the toilet tissue) for a day or two after the exam. This is normal.  - If you experience any rectal bleeding (not spotting), persistent tenderness or sharp severe abdominal pains, oral temperature over 100 degrees Fahrenheit, light-headedness or dizziness, or any other problems, contact your doctor.        **If unable to reach your doctor, please go to the [facility] Emergency Room**    - Your referring physician will receive a full report of your examination.  - If you do not hear from your doctor's office within two weeks of your biopsy, please call them for your results.    You may be able to see your laboratory results in [employer] between 4 and 7 business days.  In some cases, your physician may not have viewed the results before they are released to [employer].  If you have questions regarding your results contact the physician who ordered the test/exam by phone or via [employer] by choosing \"Ask a Medical Question.\"

## 2025-02-14 NOTE — OPERATIVE REPORT
COLONOSCOPY REPORT    Bk Vasquez     1965 Age 59 year old   PCP No primary care provider on file. Endoscopist Gigi Kincaid MD       Date of procedure: 25    Procedure: Colonoscopy w/ cold forceps polypectomy    Pre-operative diagnosis: screening    Post-operative diagnosis: polyps, diverticulosis, hemorrhoids    Medications: MAC    Withdrawal time: 14 minutes    Procedure:  Informed consent was obtained from the patient after the risks of the procedure were discussed, including but not limited to bleeding, perforation, aspiration, infection, or possibility of a missed lesion. After discussions of the risks/benefits and alternatives to this procedure, as well as the planned sedation, the patient was placed in the left lateral decubitus position and begun on continuous blood pressure pulse oximetry and EKG monitoring and this was maintained throughout the procedure. Once an adequate level of sedation was obtained a digital rectal exam was completed. Then the lubricated tip of the [id number] diagnostic video colonoscope was inserted and advanced without difficulty to the cecum using water immersion and CO2 insufflation technique. The cecum was identified by localizing the trifold, the appendix and the ileocecal valve. Withdrawal was begun with thorough washing and careful examination of the colonic walls and folds. A routine second examination of the cecum/ascending colon was performed. Photodocumentation was obtained. The bowel prep was good. Views of the colon were good with washing. I then carefully withdrew the instrument from the patient who tolerated the procedure well.     Complications: none.    Findings:   1. 2 polyps noted as follows:      A. 2 mm polyp in the sigmoid colon; sessile morphology; cold forceps polypectomy performed, polyp retrieved.      B. 2 mm polyp in the sigmoid colon; sessile morphology; cold forceps polypectomy performed, polyp retrieved.    2.  Diverticulosis: scattered, small and large diverticula throughout the colon except the cecum/rectum.    3. Ileocecal valve appeared healthy and normal.    4. The colonic mucosa throughout the colon showed normal vascular pattern, without evidence of angioectasias or inflammation.     5. Rectum was meticulously evaluated in antegrade view and was notable for small internal hemorrhoids.    6. ASHLYN: normal rectal tone, no masses palpated.     Impression:   2 small polyps removed.  Diverticulosis.  Hemorrhoids.  The colon was otherwise normal with glistening mucosa and intact vascular pattern.    Recommend:  Await pathology. The interval for the next colonoscopy will be determined after reviewing pathology. If new signs or symptoms develop, colonoscopy may need to be repeated sooner.   High fiber diet.  Monitor for blood in the stool. If having more than just tinge of blood, call office or go to the ER.    >>>If tissue was obtained and you have not received your pathology results either by phone or letter within 2 weeks, please call our office at [phone].    Specimens: polyps    Blood loss: <1 ml

## 2025-02-14 NOTE — ANESTHESIA PREPROCEDURE EVALUATION
Anesthesia PreOp Note    HPI:     Bk Vasquez is a 59 year old male who presents for preoperative consultation requested by: Gigi Kincaid MD    Date of Surgery: 2/14/2025    Procedure(s):  COLONOSCOPY  Indication: Screen for colon cancer    Relevant Problems   No relevant active problems       NPO:  Last Liquid Consumption Date: 02/14/25  Last Liquid Consumption Time: 0320  Last Solid Consumption Date: 02/13/25  Last Solid Consumption Time: 1200  Last Liquid Consumption Date: 02/14/25          History Review:  Patient Active Problem List    Diagnosis Date Noted    Class 1 obesity due to excess calories without serious comorbidity with body mass index (BMI) of 31.0 to 31.9 in adult 01/23/2025    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 06/03/2020    Sprain of anterior talofibular ligament of right ankle, subsequent encounter 05/29/2017    Gout 07/06/2011    Tobacco use disorder 07/06/2011       Past Medical History:    Constipation    Gout    Lipid screening    Per [facility]    Osteoarthritis    Right knee injury       Past Surgical History:   Procedure Laterality Date    Musculoskeletal surgery unlisted      Per [facility]:  \"B/L knee ACL repair.\"       Prescriptions Prior to Admission[1]  Current Medications and Prescriptions Ordered in Epic[2]    Allergies[3]    Family History   Problem Relation Age of Onset    Diabetes Other         Family h/o    Heart Disease Father         Coronary Artery Disease    Diabetes Father     Other (Other) Father     Other (Other) Brother         Gout    Diabetes Mother      Social History     Socioeconomic History    Marital status:    Tobacco Use    Smoking status: Never    Smokeless tobacco: Never    Tobacco comments:     quit in 2013   Vaping Use    Vaping status: Never Used   Substance and Sexual Activity    Alcohol use: Yes     Comment: maybe once a week    Drug use: Never   Other Topics Concern    Caffeine Concern Yes      Comment: Coffee, 1 cup daily    Reaction to local anesthetic No    Pt has a pacemaker No    Pt has a defibrillator No       Available pre-op labs reviewed.  Lab Results   Component Value Date    WBC 5.7 01/24/2025    RBC 5.12 01/24/2025    HGB 14.7 01/24/2025    HCT 43.8 01/24/2025    MCV 85.5 01/24/2025    MCH 28.7 01/24/2025    MCHC 33.6 01/24/2025    RDW 13.1 01/24/2025    .0 01/24/2025     Lab Results   Component Value Date     01/24/2025    K 4.6 01/24/2025     01/24/2025    CO2 27.0 01/24/2025    BUN 13 01/24/2025    CREATSERUM 1.05 01/24/2025    GLU 95 01/24/2025    CA 9.6 01/24/2025          Vital Signs:  Body mass index is 31.14 kg/m².   height is 1.778 m (5' 10\") and weight is 98.4 kg (217 lb). His blood pressure is 142/94 (abnormal) and his pulse is 65. His respiration is 18 and oxygen saturation is 98%.   Vitals:    02/07/25 1525 02/14/25 0745   BP:  (!) 142/94   Pulse:  65   Resp:  18   SpO2:  98%   Weight: 98.4 kg (217 lb)    Height: 1.778 m (5' 10\")         Anesthesia Evaluation     Patient summary reviewed and Nursing notes reviewed    Airway   Mallampati: II  TM distance: >3 FB  Neck ROM: full  Dental - Dentition appears grossly intact     Comment: Hx of repair of chipped tooth       Pulmonary - negative ROS and normal exam   Cardiovascular - negative ROS and normal exam  Exercise tolerance: good    ECG reviewed    Neuro/Psych      GI/Hepatic/Renal - negative ROS     Endo/Other - negative ROS   Abdominal  - normal exam                 Anesthesia Plan:   ASA:  1  Plan:   MAC  Informed Consent Plan and Risks Discussed With:  Patient      I have informed Bk Vasquez and/or legal guardian or family member of the nature of the anesthetic plan, benefits, risks including possible dental damage if relevant, major complications, and any alternative forms of anesthetic management.   All of the patient's questions were answered to the best of my ability. The patient desires the anesthetic  management as planned.  Lisa Burkett CRNA  2/14/2025 7:50 AM  Present on Admission:  **None**           [1]   Medications Prior to Admission   Medication Sig Dispense Refill Last Dose/Taking    Multiple Vitamins-Minerals (MAXIMUM DAILY GREEN) Oral Tab Take by mouth. GREEN SUPER FOODS   2/7/2025    Turmeric (QC TUMERIC COMPLEX OR) Take by mouth.   2/7/2025    Cholecalciferol 125 MCG (5000 UT) Oral Tab Take 1 tablet (5,000 Units total) by mouth daily.   2/7/2025    Thiamine HCl 50 MG Oral Tab Take 1 tablet (50 mg total) by mouth daily.   2/7/2025    rosuvastatin 10 MG Oral Tab Take 1 tablet (10 mg total) by mouth nightly. 90 tablet 3 Taking    allopurinol 300 MG Oral Tab Take 1 tablet (300 mg total) by mouth daily.   2/13/2025    clobetasol 0.05 % External Ointment Use twice daily to hands Monday-Friday. Take weekends off. 60 g 1 Taking    triamcinolone 0.1 % External Ointment Apply to affected area twice a day 80 g 1 Taking    polyethylene glycol, PEG 3350-KCl-NaBcb-NaCl-NaSulf, 236 g Oral Recon Soln Take 4,000 mL by mouth As Directed. Take 2,000 mL the night before your procedure and 2,000 mL the morning of your procedure. 1 each 0     indomethacin 50 MG Oral Cap Take 1 capsule (50 mg total) by mouth 3 (three) times daily with meals. As needed for pain or inflammation from gout exacerbation. 30 capsule 0     naproxen 500 MG Oral Tab Take 1 tablet (500 mg total) by mouth 2 (two) times daily with meals. 60 tablet 0     Vardenafil HCl 10 MG Oral Tab Take 0.5 tablets (5 mg total) by mouth as needed for Erectile Dysfunction. 30 tablet 0    [2]   Current Facility-Administered Medications Ordered in Epic   Medication Dose Route Frequency Provider Last Rate Last Admin    lactated ringers infusion   Intravenous Continuous Gigi Kincaid MD         No current Psychiatric-ordered outpatient medications on file.   [3]   Allergies  Allergen Reactions    Fish-Derived Products HIVES     Catfish

## 2025-02-14 NOTE — ANESTHESIA POSTPROCEDURE EVALUATION
Patient: Bk Vasquez    Procedure Summary       Date: 02/14/25 Room / Location: [facility] ENDOSCOPY 04 / [facility] ENDOSCOPY    Anesthesia Start: 0752 Anesthesia Stop:     Procedure: COLONOSCOPY Diagnosis:       Screen for colon cancer      (polyps, diverticulosis, hemorrhoids)    Surgeons: Gigi Kincaid MD Anesthesiologist: Lisa Burkett CRNA    Anesthesia Type: MAC ASA Status: 1            Anesthesia Type: No value filed.    Vitals Value Taken Time   /72 02/14/25 0821   Temp  02/14/25 0821   Pulse 62 02/14/25 0821   Resp 15 02/14/25 0821   SpO2 96% 02/14/25 0821       [facility] AN Post Evaluation:   Patient Evaluated in PACU  Patient Participation: complete - patient participated  Level of Consciousness: sleepy but conscious  Pain Score: 0  Pain Management: adequate  Airway Patency:patent  Yes    Nausea/Vomiting: none  Cardiovascular Status: acceptable  Respiratory Status: acceptable  Postoperative Hydration acceptable      Lisa Burkett CRNA  2/14/2025 8:21 AM

## 2025-02-14 NOTE — H&P
History & Physical Examination    Patient Name: Bk Vasquez  MRN: [id number]  CSN: [id number]  YOB: 1965    Diagnosis: screening for colon cancer    Prescriptions Prior to Admission[1]  Current Facility-Administered Medications   Medication Dose Route Frequency    lactated ringers infusion   Intravenous Continuous       Allergies: Allergies[2]    Past Medical History:    Constipation    Gout    Lipid screening    Per [facility]    Osteoarthritis    Right knee injury     Past Surgical History:   Procedure Laterality Date    Musculoskeletal surgery unlisted      Per [facility]:  \"B/L knee ACL repair.\"     Family History   Problem Relation Age of Onset    Diabetes Other         Family h/o    Heart Disease Father         Coronary Artery Disease    Diabetes Father     Other (Other) Father     Other (Other) Brother         Gout    Diabetes Mother      Social History     Tobacco Use    Smoking status: Never    Smokeless tobacco: Never    Tobacco comments:     quit in 2013   Substance Use Topics    Alcohol use: Yes     Comment: maybe once a week       SYSTEM Check if Review is Normal Check if Physical Exam is Normal If not normal, please explain:   HEENT [X ] [ X]    NECK  [X ] [ X]    HEART [X ] [ X]    LUNGS [X ] [ X]    ABDOMEN [X ] [ X]    EXTREMITIES [X ] [ X]    OTHER        I have discussed the risks and benefits and alternatives of the procedure with the patient/family.  They understand and agree to proceed with plan of care.   I have reviewed the History and Physical done within the last 30 days.  Any changes noted above.    Gigi Kincaid MD  [facility]                   [1]   Medications Prior to Admission   Medication Sig Dispense Refill Last Dose/Taking    Multiple Vitamins-Minerals (MAXIMUM DAILY GREEN) Oral Tab Take by mouth. GREEN SUPER FOODS   Taking    Turmeric (QC TUMERIC COMPLEX OR) Take by mouth.   Taking    Cholecalciferol 125 MCG (5000 UT) Oral Tab Take 1 tablet  (5,000 Units total) by mouth daily.   Taking    Thiamine HCl 50 MG Oral Tab Take 1 tablet (50 mg total) by mouth daily.   Taking    indomethacin 50 MG Oral Cap Take 1 capsule (50 mg total) by mouth 3 (three) times daily with meals. As needed for pain or inflammation from gout exacerbation. 30 capsule 0 Taking    naproxen 500 MG Oral Tab Take 1 tablet (500 mg total) by mouth 2 (two) times daily with meals. 60 tablet 0 Taking    rosuvastatin 10 MG Oral Tab Take 1 tablet (10 mg total) by mouth nightly. 90 tablet 3 Taking    allopurinol 300 MG Oral Tab Take 1 tablet (300 mg total) by mouth daily.   Taking    clobetasol 0.05 % External Ointment Use twice daily to hands Monday-Friday. Take weekends off. 60 g 1 Taking    Vardenafil HCl 10 MG Oral Tab Take 0.5 tablets (5 mg total) by mouth as needed for Erectile Dysfunction. 30 tablet 0 Taking As Needed    triamcinolone 0.1 % External Ointment Apply to affected area twice a day 80 g 1 Taking    polyethylene glycol, PEG 3350-KCl-NaBcb-NaCl-NaSulf, 236 g Oral Recon Soln Take 4,000 mL by mouth As Directed. Take 2,000 mL the night before your procedure and 2,000 mL the morning of your procedure. 1 each 0    [2]   Allergies  Allergen Reactions    Fish-Derived Products HIVES     Catfish

## 2025-02-19 ENCOUNTER — TELEPHONE (OUTPATIENT)
Facility: CLINIC | Age: 60
End: 2025-02-19

## 2025-02-19 NOTE — TELEPHONE ENCOUNTER
Recall colon in 7 years per Dr Kincaid. Colon done 02/14/2025    Health maintenance updated and message sent to pt outreach to repeat colonoscopy in 7 years

## 2025-02-19 NOTE — TELEPHONE ENCOUNTER
GI Staff:    GI Staff:    Can you please place a recall for this patient to have a colonoscopy repeated in 7 years.    Thank you.    Gigi Kincaid MD

## 2025-02-20 NOTE — TELEPHONE ENCOUNTER
----- Message from Gigi Kincaid sent at 2/19/2025  7:34 AM CST -----  No adenomas removed on recent colonoscopy, will repeat in 7 years.    Mychart sent.

## (undated) DEVICE — GIJAW SINGLE-USE BIOPSY FORCEPS WITH NEEDLE: Brand: GIJAW

## (undated) DEVICE — KIT ENDO ORCAPOD 160/180/190

## (undated) DEVICE — Device

## (undated) DEVICE — 60 ML SYRINGE REGULAR TIP: Brand: MONOJECT

## (undated) DEVICE — SINGLE-USE SNARE: Brand: CAPTIVATOR™ COLD

## (undated) DEVICE — KIT CLEAN ENDOKIT 1.1OZ GOWNX2

## (undated) DEVICE — V2 SPECIMEN COLLECTION MANIFOLD KIT: Brand: NEPTUNE

## (undated) DEVICE — MEDI-VAC NON-CONDUCTIVE SUCTION TUBING 6MM X 1.8M (6FT.) L: Brand: CARDINAL HEALTH

## (undated) NOTE — LETTER
[facility]  Administration of Anesthesia  I, Bk Vasquez agree to be cared for by a physician anesthesiologist alone and/or with a nurse anesthetist, who is specially trained to monitor me and give me medicine to put me to sleep or keep me comfortable during my procedure    I understand that my anesthesiologist and/or anesthetist is not an employee or agent of [facility] or [employer]. He or she works for [facility]    As the patient asking for anesthesia services, I agree to:  Allow the anesthesiologist (anesthesia doctor) to give me medicine and do additional procedures as necessary. Some examples are: Starting or using an “IV” to give me medicine, fluids or blood during my procedure, and having a breathing tube placed to help me breathe when I’m asleep (intubation). In the event that my heart stops working properly, I understand that my anesthesiologist will make every effort to sustain my life, unless otherwise directed by [facility] Do Not Resuscitate documents.  Tell my anesthesia doctor before my procedure:  If I am pregnant.  The last time that I ate or drank.  iii. All of the medicines I take (including prescriptions, herbal supplements, and pills I can buy without a prescription (including street drugs/illegal medications). Failure to inform my anesthesiologist about these medicines may increase my risk of anesthetic complications.  iv.If I am allergic to anything or have had a reaction to anesthesia before.  I understand how the anesthesia medicine will help me (benefits).  I understand that with any type of anesthesia medicine there are risks:  The most common risks are: nausea, vomiting, sore throat, muscle soreness, damage to my eyes, mouth, or teeth (from breathing tube placement).  Rare risks include: remembering what happened during my procedure, allergic reactions to medications, injury to my airway, heart, lungs, vision, nerves, or  muscles and in extremely rare instances death.  My doctor has explained to me other choices available to me for my care (alternatives).  Pregnant Patients (“epidural”):  I understand that the risks of having an epidural (medicine given into my back to help control pain during labor), include itching, low blood pressure, difficulty urinating, headache or slowing of the baby’s heart. Very rare risks include infection, bleeding, seizure, irregular heart rhythms and nerve injury.  Regional Anesthesia (“spinal”, “epidural”, & “nerve blocks”):  I understand that rare but potential complications include headache, bleeding, infection, seizure, irregular heart rhythms, and nerve injury.    _____________________________________________________________________________  Patient (or Representative) Signature/Relationship to Patient  Date   Time    _____________________________________________________________________________   Name (if used)    Language/Organization   Time    _____________________________________________________________________________  Nurse Anesthetist Signature     Date   Time  _____________________________________________________________________________  Anesthesiologist Signature     Date   Time  I have discussed the procedure and information above with the patient (or patient’s representative) and answered their questions. The patient or their representative has agreed to have anesthesia services.    _____________________________________________________________________________  Witness        Date   Time  I have verified that the signature is that of the patient or patient’s representative, and that it was signed before the procedure  Patient Name: Bk Vasquez     : 1965                 Printed: 2025 at 8:15 AM    Medical Record #: [id number]                                            Page 1 of 1  ----------ANESTHESIA CONSENT----------

## (undated) NOTE — MR AVS SNAPSHOT
[facility]  [address]  [phone]               Thank you for choosing us for your health care visit with Yris Marquez MD.  We are glad to serve you and happy to provide you with this robins may causes sedation, constipation. Not to operate heavy machinery or drive on medication. Commonly known as:  NORCO           Vitamin D3 2000 units Caps   Take 2,000 Units by mouth daily.    Commonly known as:  VITAMIN D3                   MyChart     Vis

## (undated) NOTE — LETTER
7/5/2023          To Whom It May Concern:    Kendal Solo is currently under my medical care and may not return to work at this time. Please excuse Benton Lancaster for 1 week. He may return to work on 07/13/2023. Activity is restricted as follows: none. If you require additional information please contact our office.         Sincerely,    Lucy Rangel PA-C          Document generated by:  Lucy Rangel PA-C

## (undated) NOTE — MR AVS SNAPSHOT
[facility]  [address]  [phone]               Thank you for choosing us for your health care visit with Tim Mcneill MD.  We are glad to serve you and happy to provide you with this robins to have these services done at another facility or to obtain an approved referral. Services ordered by your physician may not be covered unless prior authorization is obtained in accordance with your insurance company's guidelines.  Unauthorized care may be may causes sedation, constipation. Not to operate heavy machinery or drive on medication. What changed:  Another medication with the same name was removed. Continue taking this medication, and follow the directions you see here.    Commonly known as:  NOR

## (undated) NOTE — MR AVS SNAPSHOT
[facility] [place] [address] [address] [id number] [phone]               Thank you for choosing us for your health care visit with Eleno Everett MD.  We are glad to serve you and happy to provide you with this summary of y [employer] for assistance with your inactive [email or web address] account    If you have questions, you can call [phone] to talk to our [facility] Staff. Remember, [email or web address] is NOT to be used for urgent needs. For medical emergencies, dial 911.     Vi

## (undated) NOTE — LETTER
[facility]  [address], [place], [place]    Authorization for Surgical Operation and Procedure                               I hereby authorize Gigi Kincaid MD, my physician and his/her assistants (if applicable), which may include medical students, residents, and/or fellows, to perform the following surgical operation/ procedure and administer such anesthesia as may be determined necessary by my physician: Operation/Procedure name (s) COLONOSCOPY on Bk Vasquez   2.   I recognize that during the surgical operation/procedure, unforeseen conditions may necessitate additional or different procedures than those listed above.  I, therefore, further authorize and request that the above-named surgeon, assistants, or designees perform such procedures as are, in their judgment, necessary and desirable.    3.   My surgeon/physician has discussed prior to my surgery the potential benefits, risks and side effects of this procedure; the likelihood of achieving goals; and potential problems that might occur during recuperation.  They also discussed reasonable alternatives to the procedure, including risks, benefits, and side effects related to the alternatives and risks related to not receiving this procedure.  I have had all my questions answered and I acknowledge that no guarantee has been made as to the result that may be obtained.    4.   Should the need arise during my operation/procedure, which includes change of level of care prior to discharge, I also consent to the administration of blood and/or blood products.  Further, I understand that despite careful testing and screening of blood or blood products by collecting agencies, I may still be subject to ill effects as a result of receiving a blood transfusion and/or blood products.  The following are some, but not all, of the potential risks that can occur: fever and allergic reactions, hemolytic reactions, transmission of diseases such  as Hepatitis, AIDS and Cytomegalovirus (CMV) and fluid overload.  In the event that I wish to have an autologous transfusion of my own blood, or a directed donor transfusion, I will discuss this with my physician.  Check only if Refusing Blood or Blood Products  I understand refusal of blood or blood products as deemed necessary by my physician may have serious consequences to my condition to include possible death. I hereby assume responsibility for my refusal and release the hospital, its personnel, and my physicians from any responsibility for the consequences of my refusal.    o  Refuse   5.   I authorize the use of any specimen, organs, tissues, body parts or foreign objects that may be removed from my body during the operation/procedure for diagnosis, research or teaching purposes and their subsequent disposal by hospital authorities.  I also authorize the release of specimen test results and/or written reports to my treating physician on the hospital medical staff or other referring or consulting physicians involved in my care, at the discretion of the Pathologist or my treating physician.    6.   I consent to the photographing or videotaping of the operations or procedures to be performed, including appropriate portions of my body for medical, scientific, or educational purposes, provided my identity is not revealed by the pictures or by descriptive texts accompanying them.  If the procedure has been photographed/videotaped, the surgeon will obtain the original picture, image, videotape or CD.  The hospital will not be responsible for storage, release or maintenance of the picture, image, tape or CD.    7.   I consent to the presence of a  or observers in the operating room as deemed necessary by my physician or their designees.    8.   I recognize that in the event my procedure results in extended X-Ray/fluoroscopy time, I may develop a skin reaction.    9. If I have a Do Not Attempt  Resuscitation (DNAR) order in place, that status will be suspended while in the operating room, procedural suite, and during the recovery period unless otherwise explicitly stated by me (or a person authorized to consent on my behalf). The surgeon or my attending physician will determine when the applicable recovery period ends for purposes of reinstating the DNAR order.  10. Patients having a sterilization procedure: I understand that if the procedure is successful the results will be permanent and it will therefore be impossible for me to inseminate, conceive, or bear children.  I also understand that the procedure is intended to result in sterility, although the result has not been guaranteed.   11. I acknowledge that my physician has explained sedation/analgesia administration to me including the risk and benefits I consent to the administration of sedation/analgesia as may be necessary or desirable in the judgment of my physician.    I CERTIFY THAT I HAVE READ AND FULLY UNDERSTAND THE ABOVE CONSENT TO OPERATION and/or OTHER PROCEDURE.     ____________________________________  _________________________________        ______________________________  Signature of Patient    Signature of Responsible Person                Printed Name of Responsible Person                                      ____________________________________  _____________________________                ________________________________  Signature of Witness        Date  Time         Relationship to Patient    STATEMENT OF PHYSICIAN My signature below affirms that prior to the time of the procedure; I have explained to the patient and/or his/her legal representative, the risks and benefits involved in the proposed treatment and any reasonable alternative to the proposed treatment. I have also explained the risks and benefits involved in refusal of the proposed treatment and alternatives to the proposed treatment and have answered the patient's  questions. If I have a significant financial interest in a co-management agreement or a significant financial interest in any product or implant, or other significant relationship used in this procedure/surgery, I have disclosed this and had a discussion with my patient.     _____________________________________________________              _____________________________  (Signature of Physician)                                                                                         (Date)                                   (Time)  Patient Name: Bk Vasquez      : 1965      Printed: 2025     Medical Record #: [id number]                                      Page 1 of 1

## (undated) NOTE — MR AVS SNAPSHOT
[place]  [address]  [phone]               Thank you for choosing us for your health care visit with Elba Almaguer MD.  We are glad to serve you and happy to provide you with this robins Gram Stain    Complete by: May 26, 2017 (Approximate)    Assoc Dx:  Knee effusion, right [M25.461]           Crystal Exam, Misc Fluid [E]    Complete by:   May 26, 2017    Assoc Dx:  Knee effusion, right [M25.461]           Aerobic Bacterial Culture    Co None      Allergies as of May 26, 2017     No Known Allergies                Today's Vital Signs     BP Pulse                118/68 mmHg 72             Current Medications          This list is accurate as of: 5/26/17 12:13 PM.  Always use your most recen

## (undated) NOTE — MR AVS SNAPSHOT
[facility] SPECIALTY [facility] [place] [address] [address] [id number] [phone]               Thank you for choosing us for your health care visit with Susan Atkinson MD.  We are glad to serve you and happy to provide you with this summary of y Commonly known as:  INDOCIN           Vitamin D3 2000 units Caps   Take 2,000 Units by mouth daily.    Commonly known as:  VITAMIN D3                Where to Get Your Medications      You can get these medications from any pharmacy     Bring a paper prescri